# Patient Record
Sex: MALE | Race: BLACK OR AFRICAN AMERICAN | NOT HISPANIC OR LATINO | URBAN - METROPOLITAN AREA
[De-identification: names, ages, dates, MRNs, and addresses within clinical notes are randomized per-mention and may not be internally consistent; named-entity substitution may affect disease eponyms.]

---

## 2023-01-27 PROBLEM — N14.11 CONTRAST DYE INDUCED NEPHROPATHY: Status: ACTIVE | Noted: 2023-01-27

## 2023-01-27 PROBLEM — H52.13 BILATERAL MYOPIA: Status: ACTIVE | Noted: 2023-01-27

## 2023-01-27 PROBLEM — E55.9 VITAMIN D DEFICIENCY: Status: ACTIVE | Noted: 2023-01-27

## 2023-01-27 PROBLEM — T50.8X5A CONTRAST DYE INDUCED NEPHROPATHY: Status: ACTIVE | Noted: 2023-01-27

## 2023-01-27 PROBLEM — R21 GROIN RASH: Status: ACTIVE | Noted: 2023-01-27

## 2023-01-27 PROBLEM — L73.2 HIDRADENITIS SUPPURATIVA: Status: ACTIVE | Noted: 2023-01-27

## 2023-01-27 PROBLEM — H52.203 ASTIGMATISM, BILATERAL: Status: ACTIVE | Noted: 2023-01-27

## 2023-01-27 PROBLEM — N48.9 PENILE LESION: Status: ACTIVE | Noted: 2023-01-27

## 2023-01-27 PROBLEM — H25.13 AGE-RELATED NUCLEAR CATARACT, BILATERAL: Status: ACTIVE | Noted: 2023-01-27

## 2023-01-27 PROBLEM — N50.89 SCROTAL SWELLING: Status: ACTIVE | Noted: 2023-01-27

## 2023-01-27 PROBLEM — R53.83 FATIGUE: Status: ACTIVE | Noted: 2023-01-27

## 2023-01-27 PROBLEM — H52.13 MYOPIC ASTIGMATISM OF BOTH EYES: Status: ACTIVE | Noted: 2023-01-27

## 2023-01-27 PROBLEM — H52.203 MYOPIC ASTIGMATISM OF BOTH EYES: Status: ACTIVE | Noted: 2023-01-27

## 2023-01-27 PROBLEM — K61.1 PERI-RECTAL ABSCESS: Status: ACTIVE | Noted: 2023-01-27

## 2023-01-27 PROBLEM — E66.9 OBESITY: Status: ACTIVE | Noted: 2023-01-27

## 2023-01-27 PROBLEM — E66.3 OVERWEIGHT: Status: ACTIVE | Noted: 2023-01-27

## 2023-01-27 PROBLEM — K50.90 CROHN'S DISEASE (MULTI): Status: ACTIVE | Noted: 2023-01-27

## 2023-01-27 PROBLEM — H25.13 AGE-RELATED NUCLEAR CATARACT OF BOTH EYES: Status: ACTIVE | Noted: 2023-01-27

## 2023-01-27 RX ORDER — CYCLOBENZAPRINE HCL 10 MG
1 TABLET ORAL 2 TIMES DAILY
COMMUNITY
Start: 2019-02-02 | End: 2023-10-31 | Stop reason: WASHOUT

## 2023-01-27 RX ORDER — ADALIMUMAB 40MG/0.8ML
KIT SUBCUTANEOUS
COMMUNITY
Start: 2017-07-24 | End: 2024-03-13 | Stop reason: SDUPTHER

## 2023-01-27 RX ORDER — BENZOYL PEROXIDE 100 MG/ML
LIQUID TOPICAL
COMMUNITY
Start: 2017-10-12 | End: 2023-10-31 | Stop reason: WASHOUT

## 2023-01-27 RX ORDER — TRIAMCINOLONE ACETONIDE 0.25 MG/G
CREAM TOPICAL 2 TIMES DAILY
COMMUNITY
Start: 2018-01-23 | End: 2023-10-31 | Stop reason: WASHOUT

## 2023-01-27 RX ORDER — IBUPROFEN 800 MG/1
1 TABLET ORAL 3 TIMES DAILY
COMMUNITY
Start: 2016-04-15 | End: 2023-10-31 | Stop reason: WASHOUT

## 2023-01-27 RX ORDER — CLINDAMYCIN PHOSPHATE 10 UG/ML
LOTION TOPICAL 2 TIMES DAILY
COMMUNITY
Start: 2017-04-26 | End: 2023-03-07

## 2023-03-05 NOTE — PROGRESS NOTES
Subjective   J Carlos eYn is a 37 y.o. male who presents for Establish Care.  37M new is here for establishment of care and for preventive care visit.    PMHx:  -Hidradenitis suppuortiva- gets symptoms in the axillary region but also notes swelling and sores in scrotum and buttocks, at times bursts and bleeds, a/w severe pruritus, last seen by physician in many years. He was receiving humira every so often but notes some remission but never remission. He has been seen by derm for this and then lost to followup. Surgery was proposed but he was hesitant in the past. He was also seen by urology, and gastroenterology.  - Crohn's disease - treated intermittently with Humira, though has been off this medication for some time, has been using his wife's Humira who has a similar diagnosis   - Chronic scrotal and penile swelling evidently was seen b y     Patient was seen by , derm and surgery and was recommended surgical intervention but he declined.  He is currently interested in surgical evaluation as well.  Denies fevers, chills, chest pain palpitations shortness of breath, changes in rash or worsening of his symptoms in general.    Review as systems as indicated in HPI, otherwise unremarkable.     Objective     /81   Temp 36.8 °C (98.3 °F)   Wt 84.6 kg (186 lb 6.4 oz)      Physical Exam  Exam conducted with a chaperone present (wife present in room, additional chaperone declined. Pt agreeable to both).   Constitutional:       General: He is not in acute distress.  HENT:      Head: Normocephalic and atraumatic.   Eyes:      Conjunctiva/sclera: Conjunctivae normal.   Cardiovascular:      Rate and Rhythm: Normal rate and regular rhythm.      Heart sounds: No murmur heard.  Pulmonary:      Effort: Pulmonary effort is normal.      Breath sounds: Normal breath sounds. No wheezing or rhonchi.   Abdominal:      General: There is no distension.      Palpations: Abdomen is soft.      Tenderness: There is no abdominal  tenderness. There is no guarding or rebound.   Genitourinary:     Penis: Swelling present.       Testes:         Left: Swelling present.      Comments: Severe scrotal and penile edema without notable discharge, no inguinal LAD appreciated   Musculoskeletal:         General: No tenderness.      Right lower leg: No edema.      Left lower leg: No edema.   Skin:     General: Skin is warm and dry.   Neurological:      General: No focal deficit present.      Mental Status: He is alert and oriented to person, place, and time.   Psychiatric:         Mood and Affect: Mood normal.         Problem List Items Addressed This Visit    Assessment/Plan      Genitourinary    Scrotal swelling     Attributed to combination crohn's and HS,will refer to urology as well as dermatology for further management         Relevant Orders    Referral to Urology       Infectious/Inflammatory    Hidradenitis suppurativa - Primary     Chronic and longstanding, failed several topical preparations including clindamycin and benzyl peroxide.  He has been on biological therapy but this has been discontinued.  Will refer to dermatology         Relevant Orders    Referral to Dermatology    Referral to Dermatology       Immune    Crohn's disease (CMS/HCC)     Last seen by gastroenterology and lost to follow-up, due for colonoscopy as well as consideration for further management.  Will obtain inflammatory markers and fecal calprotectin         Relevant Orders    CBC and Auto Differential    Vitamin D, Total    Iron and TIBC    Ferritin    Reticulocytes    Vitamin B12    Methylmalonic acid, serum    Referral to Gastroenterology    Sedimentation Rate    C-reactive protein    Calprotectin Stool     Other Visit Diagnoses       Routine adult health maintenance        Relevant Orders    CBC and Auto Differential    Comprehensive Metabolic Panel    Hemoglobin A1C    Lipid Panel    TSH with reflex to Free T4 if abnormal    Vitamin D, Total    Hepatitis B Surface  Antigen    Hepatitis B surface antibody    Hepatitis B core antibody, total    Hepatitis C Antibody    Crohn's disease of colon without complication (CMS/HCC)                    Yaima Lanier DO

## 2023-03-07 ENCOUNTER — LAB (OUTPATIENT)
Dept: LAB | Facility: LAB | Age: 38
End: 2023-03-07
Payer: COMMERCIAL

## 2023-03-07 ENCOUNTER — OFFICE VISIT (OUTPATIENT)
Dept: PRIMARY CARE | Facility: CLINIC | Age: 38
End: 2023-03-07
Payer: COMMERCIAL

## 2023-03-07 VITALS — TEMPERATURE: 98.3 F | DIASTOLIC BLOOD PRESSURE: 81 MMHG | SYSTOLIC BLOOD PRESSURE: 152 MMHG | WEIGHT: 186.4 LBS

## 2023-03-07 DIAGNOSIS — K50.10 CROHN'S DISEASE OF COLON WITHOUT COMPLICATION (MULTI): ICD-10-CM

## 2023-03-07 DIAGNOSIS — K50.80 CROHN'S DISEASE OF BOTH SMALL AND LARGE INTESTINE WITHOUT COMPLICATION (MULTI): ICD-10-CM

## 2023-03-07 DIAGNOSIS — Z00.00 ROUTINE ADULT HEALTH MAINTENANCE: ICD-10-CM

## 2023-03-07 DIAGNOSIS — N50.89 SCROTAL SWELLING: ICD-10-CM

## 2023-03-07 DIAGNOSIS — K50.818 CROHN'S DISEASE OF BOTH SMALL AND LARGE INTESTINE WITH OTHER COMPLICATION (MULTI): ICD-10-CM

## 2023-03-07 DIAGNOSIS — L73.2 HIDRADENITIS SUPPURATIVA: Primary | ICD-10-CM

## 2023-03-07 LAB
ALANINE AMINOTRANSFERASE (SGPT) (U/L) IN SER/PLAS: 10 U/L (ref 10–52)
ALBUMIN (G/DL) IN SER/PLAS: 3.7 G/DL (ref 3.4–5)
ALKALINE PHOSPHATASE (U/L) IN SER/PLAS: 83 U/L (ref 33–120)
ANION GAP IN SER/PLAS: 10 MMOL/L (ref 10–20)
ASPARTATE AMINOTRANSFERASE (SGOT) (U/L) IN SER/PLAS: 13 U/L (ref 9–39)
BASOPHILS (10*3/UL) IN BLOOD BY AUTOMATED COUNT: 0.07 X10E9/L (ref 0–0.1)
BASOPHILS/100 LEUKOCYTES IN BLOOD BY AUTOMATED COUNT: 0.7 % (ref 0–2)
BILIRUBIN TOTAL (MG/DL) IN SER/PLAS: 0.3 MG/DL (ref 0–1.2)
C REACTIVE PROTEIN (MG/L) IN SER/PLAS: 1.2 MG/DL
CALCIDIOL (25 OH VITAMIN D3) (NG/ML) IN SER/PLAS: 9 NG/ML
CALCIUM (MG/DL) IN SER/PLAS: 9.1 MG/DL (ref 8.6–10.6)
CARBON DIOXIDE, TOTAL (MMOL/L) IN SER/PLAS: 27 MMOL/L (ref 21–32)
CHLORIDE (MMOL/L) IN SER/PLAS: 106 MMOL/L (ref 98–107)
CHOLESTEROL (MG/DL) IN SER/PLAS: 105 MG/DL (ref 0–199)
CHOLESTEROL IN HDL (MG/DL) IN SER/PLAS: 33 MG/DL
CHOLESTEROL/HDL RATIO: 3.2
COBALAMIN (VITAMIN B12) (PG/ML) IN SER/PLAS: 398 PG/ML (ref 211–911)
CREATININE (MG/DL) IN SER/PLAS: 0.85 MG/DL (ref 0.5–1.3)
EOSINOPHILS (10*3/UL) IN BLOOD BY AUTOMATED COUNT: 0.23 X10E9/L (ref 0–0.7)
EOSINOPHILS/100 LEUKOCYTES IN BLOOD BY AUTOMATED COUNT: 2.3 % (ref 0–6)
ERYTHROCYTE DISTRIBUTION WIDTH (RATIO) BY AUTOMATED COUNT: 16 % (ref 11.5–14.5)
ERYTHROCYTE MEAN CORPUSCULAR HEMOGLOBIN CONCENTRATION (G/DL) BY AUTOMATED: 29.7 G/DL (ref 32–36)
ERYTHROCYTE MEAN CORPUSCULAR VOLUME (FL) BY AUTOMATED COUNT: 82 FL (ref 80–100)
ERYTHROCYTES (10*6/UL) IN BLOOD BY AUTOMATED COUNT: 4.28 X10E12/L (ref 4.5–5.9)
FERRITIN (UG/LL) IN SER/PLAS: 153 UG/L (ref 20–300)
GFR MALE: >90 ML/MIN/1.73M2
GLUCOSE (MG/DL) IN SER/PLAS: 89 MG/DL (ref 74–99)
HEMATOCRIT (%) IN BLOOD BY AUTOMATED COUNT: 35 % (ref 41–52)
HEMOGLOBIN (G/DL) IN BLOOD: 10.4 G/DL (ref 13.5–17.5)
HEMOGLOBIN (PG) IN RETICULOCYTES: 28 PG (ref 28–38)
HEPATITIS B VIRUS CORE AB (PRESENCE) IN SER/PLAS BY IMM: NONREACTIVE
HEPATITIS B VIRUS SURFACE AB (MIU/ML) IN SERUM: 52.1 MIU/ML
HEPATITIS B VIRUS SURFACE AG PRESENCE IN SERUM: NONREACTIVE
HEPATITIS C VIRUS AB PRESENCE IN SERUM: NONREACTIVE
IMMATURE GRANULOCYTES/100 LEUKOCYTES IN BLOOD BY AUTOMATED COUNT: 0.3 % (ref 0–0.9)
IMMATURE RETIC FRACTION: 12.1 % (ref 0–16)
IRON (UG/DL) IN SER/PLAS: 15 UG/DL (ref 35–150)
IRON BINDING CAPACITY (UG/DL) IN SER/PLAS: 285 UG/DL (ref 240–445)
IRON SATURATION (%) IN SER/PLAS: 5 % (ref 25–45)
LDL: 61 MG/DL (ref 0–99)
LEUKOCYTES (10*3/UL) IN BLOOD BY AUTOMATED COUNT: 9.8 X10E9/L (ref 4.4–11.3)
LYMPHOCYTES (10*3/UL) IN BLOOD BY AUTOMATED COUNT: 2.5 X10E9/L (ref 1.2–4.8)
LYMPHOCYTES/100 LEUKOCYTES IN BLOOD BY AUTOMATED COUNT: 25.5 % (ref 13–44)
MONOCYTES (10*3/UL) IN BLOOD BY AUTOMATED COUNT: 0.65 X10E9/L (ref 0.1–1)
MONOCYTES/100 LEUKOCYTES IN BLOOD BY AUTOMATED COUNT: 6.6 % (ref 2–10)
NEUTROPHILS (10*3/UL) IN BLOOD BY AUTOMATED COUNT: 6.31 X10E9/L (ref 1.2–7.7)
NEUTROPHILS/100 LEUKOCYTES IN BLOOD BY AUTOMATED COUNT: 64.6 % (ref 40–80)
NRBC (PER 100 WBCS) BY AUTOMATED COUNT: 0 /100 WBC (ref 0–0)
PLATELETS (10*3/UL) IN BLOOD AUTOMATED COUNT: 447 X10E9/L (ref 150–450)
POTASSIUM (MMOL/L) IN SER/PLAS: 4.5 MMOL/L (ref 3.5–5.3)
PROTEIN TOTAL: 8 G/DL (ref 6.4–8.2)
RETICULOCYTES (10*3/UL) IN BLOOD: 0.05 X10E12/L (ref 0.02–0.12)
RETICULOCYTES/100 ERYTHROCYTES IN BLOOD BY AUTOMATED COUNT: 1.2 % (ref 0.5–2)
SEDIMENTATION RATE, ERYTHROCYTE: 62 MM/H (ref 0–15)
SODIUM (MMOL/L) IN SER/PLAS: 138 MMOL/L (ref 136–145)
THYROTROPIN (MIU/L) IN SER/PLAS BY DETECTION LIMIT <= 0.05 MIU/L: 1.4 MIU/L (ref 0.44–3.98)
TRIGLYCERIDE (MG/DL) IN SER/PLAS: 56 MG/DL (ref 0–149)
UREA NITROGEN (MG/DL) IN SER/PLAS: 7 MG/DL (ref 6–23)
VLDL: 11 MG/DL (ref 0–40)

## 2023-03-07 PROCEDURE — 86803 HEPATITIS C AB TEST: CPT

## 2023-03-07 PROCEDURE — 83550 IRON BINDING TEST: CPT

## 2023-03-07 PROCEDURE — 85025 COMPLETE CBC W/AUTO DIFF WBC: CPT

## 2023-03-07 PROCEDURE — 85652 RBC SED RATE AUTOMATED: CPT

## 2023-03-07 PROCEDURE — 80053 COMPREHEN METABOLIC PANEL: CPT

## 2023-03-07 PROCEDURE — 80061 LIPID PANEL: CPT

## 2023-03-07 PROCEDURE — 87467 HEPATITIS B SURFACE AG QUAN: CPT

## 2023-03-07 PROCEDURE — 83921 ORGANIC ACID SINGLE QUANT: CPT

## 2023-03-07 PROCEDURE — 82728 ASSAY OF FERRITIN: CPT

## 2023-03-07 PROCEDURE — 83540 ASSAY OF IRON: CPT

## 2023-03-07 PROCEDURE — 36415 COLL VENOUS BLD VENIPUNCTURE: CPT

## 2023-03-07 PROCEDURE — 84443 ASSAY THYROID STIM HORMONE: CPT

## 2023-03-07 PROCEDURE — 86704 HEP B CORE ANTIBODY TOTAL: CPT

## 2023-03-07 PROCEDURE — 83036 HEMOGLOBIN GLYCOSYLATED A1C: CPT

## 2023-03-07 PROCEDURE — 86140 C-REACTIVE PROTEIN: CPT

## 2023-03-07 PROCEDURE — 85045 AUTOMATED RETICULOCYTE COUNT: CPT

## 2023-03-07 PROCEDURE — 82607 VITAMIN B-12: CPT

## 2023-03-07 PROCEDURE — 86706 HEP B SURFACE ANTIBODY: CPT

## 2023-03-07 PROCEDURE — 82306 VITAMIN D 25 HYDROXY: CPT

## 2023-03-07 PROCEDURE — 99204 OFFICE O/P NEW MOD 45 MIN: CPT | Performed by: INTERNAL MEDICINE

## 2023-03-07 NOTE — ASSESSMENT & PLAN NOTE
Chronic and longstanding, failed several topical preparations including clindamycin and benzyl peroxide.  He has been on biological therapy but this has been discontinued.  Will refer to dermatology

## 2023-03-07 NOTE — ASSESSMENT & PLAN NOTE
Last seen by gastroenterology and lost to follow-up, due for colonoscopy as well as consideration for further management.  Will obtain inflammatory markers and fecal calprotectin

## 2023-03-07 NOTE — ASSESSMENT & PLAN NOTE
Attributed to combination crohn's and HS,will refer to urology as well as dermatology for further management

## 2023-03-08 LAB
ESTIMATED AVERAGE GLUCOSE FOR HBA1C: 123 MG/DL
HEMOGLOBIN A1C/HEMOGLOBIN TOTAL IN BLOOD: 5.9 %

## 2023-03-10 DIAGNOSIS — E55.9 VITAMIN D DEFICIENCY: Primary | ICD-10-CM

## 2023-03-10 DIAGNOSIS — E55.9 VITAMIN D DEFICIENCY: ICD-10-CM

## 2023-03-10 RX ORDER — ERGOCALCIFEROL 1.25 MG/1
50000 CAPSULE ORAL
Qty: 12 CAPSULE | Refills: 0 | Status: SHIPPED | OUTPATIENT
Start: 2023-03-10 | End: 2023-04-17 | Stop reason: SDUPTHER

## 2023-03-10 RX ORDER — ERGOCALCIFEROL 1.25 MG/1
50000 CAPSULE ORAL
Qty: 12 CAPSULE | Refills: 0 | Status: SHIPPED | OUTPATIENT
Start: 2023-03-10 | End: 2023-03-10 | Stop reason: SDUPTHER

## 2023-03-11 LAB — METHYLMALONIC ACID, S: 0.11 UMOL/L (ref 0–0.4)

## 2023-04-17 DIAGNOSIS — E55.9 VITAMIN D DEFICIENCY: ICD-10-CM

## 2023-04-17 RX ORDER — ERGOCALCIFEROL 1.25 MG/1
50000 CAPSULE ORAL
Qty: 12 CAPSULE | Refills: 0 | Status: SHIPPED | OUTPATIENT
Start: 2023-04-17 | End: 2023-07-10

## 2023-05-04 ENCOUNTER — TELEPHONE (OUTPATIENT)
Dept: PRIMARY CARE | Facility: CLINIC | Age: 38
End: 2023-05-04
Payer: COMMERCIAL

## 2023-05-04 NOTE — TELEPHONE ENCOUNTER
Called and spoke to both patient and his wife.  Evidently his wife sent a message over the portal.  He reports worsening of symptoms and has not yet been to see the specialist that had been referred him at our last visit.  Given his symptoms I advise he present urgently to the emergency room for additional work-up and for immediate consultation with the appropriate specialist.  He is in agreement, will call his wife right now to transport him to [facility] or the closest hospital.  We will follow-up closely.    ----- Message from Preet Ellsworth MA sent at 5/3/2023  8:43 AM EDT -----  Regarding: FW: Pain/ insecurity   Contact: [phone]    ----- Message -----  From: J Carlos Yen  Sent: 5/3/2023   8:14 AM EDT  To: Do [id number] [facility] Clinical Support Staff  Subject: Pain/ insecurity                                 Good morning Dr. Lanier! I am at a point where I don’t know what to do, my tailbone is leaking, my scrotum and or testicles is leaking and I am bleeding both inside and out! The smell is something awful and I can barely walk, stand or sit. I am very uncomfortable and it’s making very angry! What do you recommend! Also I am on the schedule for gastro this month and urology in July, this is really starting to affect my everyday life and work schedule. I can not walk more than 4 steps without breaking out into a sweat.

## 2023-05-12 ENCOUNTER — PATIENT OUTREACH (OUTPATIENT)
Dept: PRIMARY CARE | Facility: CLINIC | Age: 38
End: 2023-05-12
Payer: COMMERCIAL

## 2023-05-12 DIAGNOSIS — L73.2 HIDRADENITIS: ICD-10-CM

## 2023-05-12 DIAGNOSIS — L02.91 ABSCESS: ICD-10-CM

## 2023-05-12 RX ORDER — AMOXICILLIN AND CLAVULANATE POTASSIUM 875; 125 MG/1; MG/1
875 TABLET, FILM COATED ORAL
COMMUNITY
End: 2024-01-31 | Stop reason: WASHOUT

## 2023-05-12 NOTE — PROGRESS NOTES
Discharge Facility:[facility]  Discharge Diagnosis:Abscess, Hidradenitis  Admission Date:5/7/23  Discharge Date: 5/11/23    PCP Appointment Date:5/16/23  Specialist Appointment Date: 5/18/23, 6/7/23  Hospital Encounter and Summary: Linked   See discharge assessment below for further details  Engagement  Call Start Time: 1132 (5/12/2023 11:40 AM)    Medications  Medications reviewed with patient/caregiver?: Yes (5/12/2023 11:40 AM)  Is the patient having any side effects they believe may be caused by any medication additions or changes?: No (5/12/2023 11:40 AM)  Does the patient have all medications ordered at discharge?: Yes (5/12/2023 11:40 AM)  Prescription Comments: see med list (5/12/2023 11:40 AM)  Is the patient taking all medications as directed (includes completed medication regime)?: Yes (5/12/2023 11:40 AM)  Care Management Interventions: Provided patient education (5/12/2023 11:40 AM)  Medication Comments: see med list (5/12/2023 11:40 AM)    Appointments  Does the patient have a primary care provider?: Yes (5/12/2023 11:40 AM)  Care Management Interventions: Verified appointment date/time/provider; Educated patient on importance of making appointment; Advised patient to make appointment (5/12/2023 11:40 AM)  Has the patient kept scheduled appointments due by today?: Yes (5/12/2023 11:40 AM)  Care Management Interventions: Advised patient to keep appointment (5/12/2023 11:40 AM)    Patient Teaching  Does the patient have access to their discharge instructions?: Yes (5/12/2023 11:40 AM)  Care Management Interventions: Reviewed instructions with patient (5/12/2023 11:40 AM)  What is the patient's perception of their health status since discharge?: Improving (5/12/2023 11:40 AM)

## 2023-05-16 ENCOUNTER — APPOINTMENT (OUTPATIENT)
Dept: PRIMARY CARE | Facility: CLINIC | Age: 38
End: 2023-05-16
Payer: COMMERCIAL

## 2023-05-23 LAB — CALPROTECTIN, STOOL: 33 UG/G

## 2023-05-25 LAB
AB TO ADALIMUMAB(ATA) CONC.: <1.7 U/ML
ADA RESULTS: NORMAL
ADALIMUMAB(ADA) CONC.: <1.6 UG/ML

## 2023-08-01 LAB
CALCIUM OXALATE CRYSTALS, URINE: ABNORMAL /HPF
MUCUS, URINE: ABNORMAL /LPF
RBC, URINE: 21 /HPF (ref 0–5)
SQUAMOUS EPITHELIAL CELLS, URINE: <1 /HPF
WBC, URINE: <1 /HPF (ref 0–5)

## 2023-08-28 ENCOUNTER — E-VISIT (OUTPATIENT)
Dept: PRIMARY CARE | Facility: CLINIC | Age: 38
End: 2023-08-28
Payer: COMMERCIAL

## 2023-09-15 ENCOUNTER — HOSPITAL ENCOUNTER (OUTPATIENT)
Dept: DATA CONVERSION | Facility: HOSPITAL | Age: 38
End: 2023-09-15
Attending: INTERNAL MEDICINE | Admitting: INTERNAL MEDICINE
Payer: COMMERCIAL

## 2023-09-15 DIAGNOSIS — Z98.0 INTESTINAL BYPASS AND ANASTOMOSIS STATUS: ICD-10-CM

## 2023-09-15 DIAGNOSIS — K50.90 CROHN'S DISEASE, UNSPECIFIED, WITHOUT COMPLICATIONS (MULTI): ICD-10-CM

## 2023-09-15 DIAGNOSIS — L73.2 HIDRADENITIS SUPPURATIVA: ICD-10-CM

## 2023-09-15 DIAGNOSIS — K50.80 CROHN'S DISEASE OF BOTH SMALL AND LARGE INTESTINE WITHOUT COMPLICATIONS (MULTI): ICD-10-CM

## 2023-10-26 ASSESSMENT — PROMIS GLOBAL HEALTH SCALE
RATE_GENERAL_HEALTH: POOR
EMOTIONAL_PROBLEMS: ALWAYS
CARRYOUT_SOCIAL_ACTIVITIES: POOR
RATE_AVERAGE_PAIN: 9
RATE_MENTAL_HEALTH: FAIR
RATE_PHYSICAL_HEALTH: POOR
CARRYOUT_PHYSICAL_ACTIVITIES: A LITTLE
RATE_QUALITY_OF_LIFE: FAIR
RATE_SOCIAL_SATISFACTION: POOR
RATE_AVERAGE_FATIGUE: VERY SEVERE

## 2023-10-31 ENCOUNTER — OFFICE VISIT (OUTPATIENT)
Dept: PRIMARY CARE | Facility: CLINIC | Age: 38
End: 2023-10-31
Payer: COMMERCIAL

## 2023-10-31 ENCOUNTER — LAB (OUTPATIENT)
Dept: LAB | Facility: LAB | Age: 38
End: 2023-10-31
Payer: COMMERCIAL

## 2023-10-31 VITALS
DIASTOLIC BLOOD PRESSURE: 75 MMHG | OXYGEN SATURATION: 98 % | BODY MASS INDEX: 28.83 KG/M2 | HEIGHT: 66 IN | WEIGHT: 179.4 LBS | HEART RATE: 105 BPM | SYSTOLIC BLOOD PRESSURE: 137 MMHG | TEMPERATURE: 99.2 F

## 2023-10-31 DIAGNOSIS — D50.0 IRON DEFICIENCY ANEMIA DUE TO CHRONIC BLOOD LOSS: Primary | ICD-10-CM

## 2023-10-31 DIAGNOSIS — R73.03 PREDIABETES: ICD-10-CM

## 2023-10-31 DIAGNOSIS — Z00.00 ENCOUNTER FOR PREVENTATIVE ADULT HEALTH CARE EXAMINATION: ICD-10-CM

## 2023-10-31 DIAGNOSIS — L73.2 HIDRADENITIS SUPPURATIVA: ICD-10-CM

## 2023-10-31 DIAGNOSIS — M62.830 BACK MUSCLE SPASM: ICD-10-CM

## 2023-10-31 DIAGNOSIS — K50.919 CROHN'S DISEASE WITH COMPLICATION, UNSPECIFIED GASTROINTESTINAL TRACT LOCATION (MULTI): ICD-10-CM

## 2023-10-31 LAB
EST. AVERAGE GLUCOSE BLD GHB EST-MCNC: 108 MG/DL
HBA1C MFR BLD: 5.4 %

## 2023-10-31 PROCEDURE — 4004F PT TOBACCO SCREEN RCVD TLK: CPT | Performed by: INTERNAL MEDICINE

## 2023-10-31 PROCEDURE — 3008F BODY MASS INDEX DOCD: CPT | Performed by: INTERNAL MEDICINE

## 2023-10-31 PROCEDURE — 99214 OFFICE O/P EST MOD 30 MIN: CPT | Performed by: INTERNAL MEDICINE

## 2023-10-31 PROCEDURE — 85025 COMPLETE CBC W/AUTO DIFF WBC: CPT

## 2023-10-31 PROCEDURE — 36415 COLL VENOUS BLD VENIPUNCTURE: CPT

## 2023-10-31 PROCEDURE — 83036 HEMOGLOBIN GLYCOSYLATED A1C: CPT

## 2023-10-31 RX ORDER — METFORMIN HYDROCHLORIDE 500 MG/1
1000 TABLET, EXTENDED RELEASE ORAL
Qty: 180 TABLET | Refills: 1 | Status: SHIPPED | OUTPATIENT
Start: 2023-10-31 | End: 2024-02-05 | Stop reason: SDUPTHER

## 2023-10-31 RX ORDER — DOXYCYCLINE 100 MG/1
100 CAPSULE ORAL 2 TIMES DAILY
COMMUNITY
End: 2023-10-31 | Stop reason: SDUPTHER

## 2023-10-31 RX ORDER — DOXYCYCLINE 100 MG/1
100 CAPSULE ORAL 2 TIMES DAILY
Qty: 90 CAPSULE | Refills: 0 | Status: SHIPPED | OUTPATIENT
Start: 2023-10-31 | End: 2024-01-31 | Stop reason: SDUPTHER

## 2023-10-31 RX ORDER — CYCLOBENZAPRINE HCL 5 MG
5 TABLET ORAL NIGHTLY PRN
Qty: 30 TABLET | Refills: 0 | Status: SHIPPED | OUTPATIENT
Start: 2023-10-31 | End: 2024-01-11

## 2023-10-31 ASSESSMENT — PATIENT HEALTH QUESTIONNAIRE - PHQ9
1. LITTLE INTEREST OR PLEASURE IN DOING THINGS: NOT AT ALL
2. FEELING DOWN, DEPRESSED OR HOPELESS: NOT AT ALL
SUM OF ALL RESPONSES TO PHQ9 QUESTIONS 1 AND 2: 0

## 2023-10-31 ASSESSMENT — PAIN SCALES - GENERAL: PAINLEVEL: 7

## 2023-10-31 NOTE — ASSESSMENT & PLAN NOTE
Extensively discussed, interested in nutrition referral.   Will start prediabetes, potential side effects reviewed.

## 2023-10-31 NOTE — PATIENT INSTRUCTIONS
J Carlos,   Follow-up with dermatology as scheduled.  For prediabetes and HS  Refill for doxycycline has been prescribed   Start metformin 1 tablet once per day with food. If tolerating, increase to 2 tablets once per day   Take 5000 units of Vitamin D3 daily FOREVER  Iron deficiency - take 325mg every OTHER day.   For back spasm - can take cyclobenzaprine as needed. Caution as this may be sedating   I have ordered blood and/or urine tests for you to do today. The lab can be found on this floor (2nd floor) next to the pharmacy across from the elevators.    Followup 3 months for physical.

## 2023-11-01 LAB
BASOPHILS # BLD AUTO: 0.1 X10*3/UL (ref 0–0.1)
BASOPHILS NFR BLD AUTO: 0.6 %
EOSINOPHIL # BLD AUTO: 0.5 X10*3/UL (ref 0–0.7)
EOSINOPHIL NFR BLD AUTO: 3 %
ERYTHROCYTE [DISTWIDTH] IN BLOOD BY AUTOMATED COUNT: 18.2 % (ref 11.5–14.5)
HCT VFR BLD AUTO: 29.7 % (ref 41–52)
HGB BLD-MCNC: 8.5 G/DL (ref 13.5–17.5)
IMM GRANULOCYTES # BLD AUTO: 0.08 X10*3/UL (ref 0–0.7)
IMM GRANULOCYTES NFR BLD AUTO: 0.5 % (ref 0–0.9)
LYMPHOCYTES # BLD AUTO: 2.46 X10*3/UL (ref 1.2–4.8)
LYMPHOCYTES NFR BLD AUTO: 14.9 %
MCH RBC QN AUTO: 22.3 PG (ref 26–34)
MCHC RBC AUTO-ENTMCNC: 28.6 G/DL (ref 32–36)
MCV RBC AUTO: 78 FL (ref 80–100)
MONOCYTES # BLD AUTO: 1.11 X10*3/UL (ref 0.1–1)
MONOCYTES NFR BLD AUTO: 6.7 %
NEUTROPHILS # BLD AUTO: 12.21 X10*3/UL (ref 1.2–7.7)
NEUTROPHILS NFR BLD AUTO: 74.3 %
NRBC BLD-RTO: 0 /100 WBCS (ref 0–0)
PLATELET # BLD AUTO: 577 X10*3/UL (ref 150–450)
PMV BLD AUTO: 10.5 FL (ref 7.5–11.5)
RBC # BLD AUTO: 3.82 X10*6/UL (ref 4.5–5.9)
WBC # BLD AUTO: 16.5 X10*3/UL (ref 4.4–11.3)

## 2023-11-30 ENCOUNTER — OFFICE VISIT (OUTPATIENT)
Dept: DERMATOLOGY | Facility: CLINIC | Age: 38
End: 2023-11-30
Payer: COMMERCIAL

## 2023-11-30 ENCOUNTER — LAB (OUTPATIENT)
Dept: LAB | Facility: LAB | Age: 38
End: 2023-11-30
Payer: COMMERCIAL

## 2023-11-30 DIAGNOSIS — L73.2 HIDRADENITIS SUPPURATIVA: ICD-10-CM

## 2023-11-30 DIAGNOSIS — L73.2 HIDRADENITIS SUPPURATIVA: Primary | ICD-10-CM

## 2023-11-30 LAB
ALBUMIN SERPL BCP-MCNC: 3.5 G/DL (ref 3.4–5)
ALP SERPL-CCNC: 79 U/L (ref 33–120)
ALT SERPL W P-5'-P-CCNC: 17 U/L (ref 10–52)
ANION GAP SERPL CALC-SCNC: 13 MMOL/L (ref 10–20)
AST SERPL W P-5'-P-CCNC: 16 U/L (ref 9–39)
BILIRUB SERPL-MCNC: 0.3 MG/DL (ref 0–1.2)
BUN SERPL-MCNC: 7 MG/DL (ref 6–23)
CALCIUM SERPL-MCNC: 8.7 MG/DL (ref 8.6–10.6)
CHLORIDE SERPL-SCNC: 103 MMOL/L (ref 98–107)
CO2 SERPL-SCNC: 27 MMOL/L (ref 21–32)
CREAT SERPL-MCNC: 0.74 MG/DL (ref 0.5–1.3)
ERYTHROCYTE [DISTWIDTH] IN BLOOD BY AUTOMATED COUNT: 17 % (ref 11.5–14.5)
GFR SERPL CREATININE-BSD FRML MDRD: >90 ML/MIN/1.73M*2
GLUCOSE SERPL-MCNC: 94 MG/DL (ref 74–99)
HBV CORE AB SER QL: NONREACTIVE
HBV SURFACE AB SER-ACNC: 46.3 MIU/ML
HBV SURFACE AG SERPL QL IA: NONREACTIVE
HCT VFR BLD AUTO: 29.4 % (ref 41–52)
HCV AB SER QL: NONREACTIVE
HGB BLD-MCNC: 8.9 G/DL (ref 13.5–17.5)
HIV 1+2 AB+HIV1 P24 AG SERPL QL IA: NONREACTIVE
MCH RBC QN AUTO: 22 PG (ref 26–34)
MCHC RBC AUTO-ENTMCNC: 30.3 G/DL (ref 32–36)
MCV RBC AUTO: 73 FL (ref 80–100)
NRBC BLD-RTO: 0 /100 WBCS (ref 0–0)
PLATELET # BLD AUTO: 694 X10*3/UL (ref 150–450)
POTASSIUM SERPL-SCNC: 4 MMOL/L (ref 3.5–5.3)
PROT SERPL-MCNC: 7.7 G/DL (ref 6.4–8.2)
RBC # BLD AUTO: 4.05 X10*6/UL (ref 4.5–5.9)
SODIUM SERPL-SCNC: 139 MMOL/L (ref 136–145)
WBC # BLD AUTO: 15.3 X10*3/UL (ref 4.4–11.3)

## 2023-11-30 PROCEDURE — 86481 TB AG RESPONSE T-CELL SUSP: CPT

## 2023-11-30 PROCEDURE — 85027 COMPLETE CBC AUTOMATED: CPT

## 2023-11-30 PROCEDURE — 80053 COMPREHEN METABOLIC PANEL: CPT

## 2023-11-30 PROCEDURE — 36415 COLL VENOUS BLD VENIPUNCTURE: CPT

## 2023-11-30 PROCEDURE — 3008F BODY MASS INDEX DOCD: CPT | Performed by: DERMATOLOGY

## 2023-11-30 PROCEDURE — 86706 HEP B SURFACE ANTIBODY: CPT

## 2023-11-30 PROCEDURE — 4004F PT TOBACCO SCREEN RCVD TLK: CPT | Performed by: DERMATOLOGY

## 2023-11-30 PROCEDURE — 87389 HIV-1 AG W/HIV-1&-2 AB AG IA: CPT

## 2023-11-30 PROCEDURE — 99204 OFFICE O/P NEW MOD 45 MIN: CPT | Performed by: DERMATOLOGY

## 2023-11-30 PROCEDURE — 87340 HEPATITIS B SURFACE AG IA: CPT

## 2023-11-30 PROCEDURE — 86704 HEP B CORE ANTIBODY TOTAL: CPT

## 2023-11-30 PROCEDURE — 86803 HEPATITIS C AB TEST: CPT

## 2023-11-30 RX ORDER — CHLORHEXIDINE GLUCONATE 40 MG/ML
SOLUTION TOPICAL DAILY PRN
Qty: 300 ML | Refills: 3 | Status: SHIPPED | OUTPATIENT
Start: 2023-11-30

## 2023-11-30 ASSESSMENT — DERMATOLOGY QUALITY OF LIFE (QOL) ASSESSMENT
RATE HOW EMOTIONALLY BOTHERED YOU ARE BY YOUR SKIN PROBLEM (FOR EXAMPLE, WORRY, EMBARRASSMENT, FRUSTRATION): 6 - ALWAYS BOTHERED
RATE HOW BOTHERED YOU ARE BY EFFECTS OF YOUR SKIN PROBLEMS ON YOUR ACTIVITIES (EG, GOING OUT, ACCOMPLISHING WHAT YOU WANT, WORK ACTIVITIES OR YOUR RELATIONSHIPS WITH OTHERS): 6 - ALWAYS BOTHERED
WHAT SINGLE SKIN CONDITION LISTED BELOW IS THE PATIENT ANSWERING THE QUALITY-OF-LIFE ASSESSMENT QUESTIONS ABOUT: HIDRADENITIS SUPPURATIVA
RATE HOW BOTHERED YOU ARE BY SYMPTOMS OF YOUR SKIN PROBLEM (EG, ITCHING, STINGING BURNING, HURTING OR SKIN IRRITATION): 6 - ALWAYS BOTHERED
WHAT SINGLE SKIN CONDITION LISTED BELOW IS THE PATIENT ANSWERING THE QUALITY-OF-LIFE ASSESSMENT QUESTIONS ABOUT: HIDRADENITIS SUPPURATIVA
RATE HOW BOTHERED YOU ARE BY SYMPTOMS OF YOUR SKIN PROBLEM (EG, ITCHING, STINGING BURNING, HURTING OR SKIN IRRITATION): 6 - ALWAYS BOTHERED
RATE HOW BOTHERED YOU ARE BY EFFECTS OF YOUR SKIN PROBLEMS ON YOUR ACTIVITIES (EG, GOING OUT, ACCOMPLISHING WHAT YOU WANT, WORK ACTIVITIES OR YOUR RELATIONSHIPS WITH OTHERS): 6 - ALWAYS BOTHERED
RATE HOW EMOTIONALLY BOTHERED YOU ARE BY YOUR SKIN PROBLEM (FOR EXAMPLE, WORRY, EMBARRASSMENT, FRUSTRATION): 6 - ALWAYS BOTHERED

## 2023-11-30 ASSESSMENT — PATIENT GLOBAL ASSESSMENT (PGA): WHAT IS THE PGA: PATIENT GLOBAL ASSESSMENT:  4 - SEVERE

## 2023-11-30 NOTE — PROGRESS NOTES
Subjective     J Carlos Yen is a 38 y.o. male who presents for the following: Hidradenitis Suppurativa (Was previously on Humira for 1 year and was helping - now having insurance issues and been off for about 18 months. ). Rarely gets it in his armpits. Mainly in buttocks and groin.  Humira helped about 85% (reduced outbreaks and the size).  He is currently on minocycline and doxycycline.      Review of Systems:  No other skin or systemic complaints other than what is documented elsewhere in the note.    The following portions of the chart were reviewed this encounter and updated as appropriate:          Skin Cancer History  No skin cancer on file.      Specialty Problems          Dermatology Problems    Groin rash    Hidradenitis suppurativa        Objective   Well appearing patient in no apparent distress; mood and affect are within normal limits.    A focused skin examination was performed. All findings within normal limits unless otherwise noted below.    Assessment/Plan     Severe hidradenitits suppurativa with lymphedema. Will check labs today for Humira. Plan to restart Humira.

## 2023-12-02 LAB
NIL(NEG) CONTROL SPOT COUNT: NORMAL
PANEL A SPOT COUNT: 1
PANEL B SPOT COUNT: 1
POS CONTROL SPOT COUNT: NORMAL
T-SPOT. TB INTERPRETATION: NEGATIVE

## 2023-12-04 ENCOUNTER — TELEPHONE (OUTPATIENT)
Dept: LAB | Facility: HOSPITAL | Age: 38
End: 2023-12-04
Payer: COMMERCIAL

## 2023-12-04 DIAGNOSIS — D75.839 THROMBOCYTOSIS: Primary | ICD-10-CM

## 2023-12-05 ENCOUNTER — SPECIALTY PHARMACY (OUTPATIENT)
Dept: PHARMACY | Facility: CLINIC | Age: 38
End: 2023-12-05

## 2023-12-05 DIAGNOSIS — L73.2 HIDRADENITIS SUPPURATIVA: ICD-10-CM

## 2023-12-05 RX ORDER — ADALIMUMAB 40MG/0.4ML
40 KIT SUBCUTANEOUS
Qty: 4 EACH | Refills: 11 | Status: SHIPPED | OUTPATIENT
Start: 2023-12-05 | End: 2023-12-22 | Stop reason: SDUPTHER

## 2023-12-05 RX ORDER — ADALIMUMAB 80MG/0.8ML
KIT SUBCUTANEOUS
Qty: 3 EACH | Refills: 0 | Status: SHIPPED | OUTPATIENT
Start: 2023-12-05 | End: 2023-12-22 | Stop reason: SDUPTHER

## 2023-12-05 NOTE — TELEPHONE ENCOUNTER
I spoke with the patient about his results.  I discussed with the patient about his results.  His platelets are elevated and have been elevated in the past.  His white blood cell counts are also elevated as they have been in the past.  His red blood cell count is low as they have been in the past, though his MCV is also low.  I discussed with him that I would like him to see a blood doctor to evaluate these findings.  I don't want to assume that his elevated platelets are due to the inflammation from his hidradenititis suppurativa.  We will order the humira.

## 2023-12-11 DIAGNOSIS — M62.830 BACK MUSCLE SPASM: ICD-10-CM

## 2023-12-13 LAB — HOLD SPECIMEN: NORMAL

## 2023-12-22 DIAGNOSIS — L73.2 HIDRADENITIS SUPPURATIVA: ICD-10-CM

## 2023-12-22 RX ORDER — ADALIMUMAB 80MG/0.8ML
KIT SUBCUTANEOUS
Qty: 3 EACH | Refills: 0 | Status: SHIPPED | OUTPATIENT
Start: 2023-12-22 | End: 2024-01-21

## 2023-12-22 RX ORDER — ADALIMUMAB 40MG/0.4ML
40 KIT SUBCUTANEOUS
Qty: 4 EACH | Refills: 11 | Status: SHIPPED | OUTPATIENT
Start: 2023-12-22 | End: 2024-01-31 | Stop reason: WASHOUT

## 2024-01-11 RX ORDER — CYCLOBENZAPRINE HCL 5 MG
5 TABLET ORAL NIGHTLY PRN
Qty: 10 TABLET | Refills: 0 | Status: SHIPPED | OUTPATIENT
Start: 2024-01-11 | End: 2024-02-05 | Stop reason: SDUPTHER

## 2024-01-18 ENCOUNTER — APPOINTMENT (OUTPATIENT)
Dept: HEMATOLOGY/ONCOLOGY | Facility: CLINIC | Age: 39
End: 2024-01-18
Payer: COMMERCIAL

## 2024-01-25 ENCOUNTER — APPOINTMENT (OUTPATIENT)
Dept: HEMATOLOGY/ONCOLOGY | Facility: CLINIC | Age: 39
End: 2024-01-25
Payer: COMMERCIAL

## 2024-01-31 ENCOUNTER — OFFICE VISIT (OUTPATIENT)
Dept: PRIMARY CARE | Facility: CLINIC | Age: 39
End: 2024-01-31
Payer: COMMERCIAL

## 2024-01-31 ENCOUNTER — LAB (OUTPATIENT)
Dept: LAB | Facility: LAB | Age: 39
End: 2024-01-31
Payer: COMMERCIAL

## 2024-01-31 VITALS
SYSTOLIC BLOOD PRESSURE: 119 MMHG | OXYGEN SATURATION: 97 % | HEART RATE: 106 BPM | WEIGHT: 171 LBS | DIASTOLIC BLOOD PRESSURE: 62 MMHG | HEIGHT: 66 IN | BODY MASS INDEX: 27.48 KG/M2

## 2024-01-31 DIAGNOSIS — Z00.00 ENCOUNTER FOR PREVENTATIVE ADULT HEALTH CARE EXAMINATION: Primary | ICD-10-CM

## 2024-01-31 DIAGNOSIS — D50.0 IRON DEFICIENCY ANEMIA DUE TO CHRONIC BLOOD LOSS: ICD-10-CM

## 2024-01-31 DIAGNOSIS — L73.2 HIDRADENITIS SUPPURATIVA: ICD-10-CM

## 2024-01-31 DIAGNOSIS — F17.200 TOBACCO USE DISORDER: ICD-10-CM

## 2024-01-31 DIAGNOSIS — K50.919 CROHN'S DISEASE WITH COMPLICATION, UNSPECIFIED GASTROINTESTINAL TRACT LOCATION (MULTI): ICD-10-CM

## 2024-01-31 DIAGNOSIS — Z00.00 ENCOUNTER FOR PREVENTATIVE ADULT HEALTH CARE EXAMINATION: ICD-10-CM

## 2024-01-31 DIAGNOSIS — R26.9 GAIT DISORDER: ICD-10-CM

## 2024-01-31 PROBLEM — E66.9 OBESITY: Status: RESOLVED | Noted: 2023-01-27 | Resolved: 2024-01-31

## 2024-01-31 PROBLEM — H52.13 MYOPIC ASTIGMATISM OF BOTH EYES: Status: RESOLVED | Noted: 2023-01-27 | Resolved: 2024-01-31

## 2024-01-31 PROBLEM — H52.203 MYOPIC ASTIGMATISM OF BOTH EYES: Status: RESOLVED | Noted: 2023-01-27 | Resolved: 2024-01-31

## 2024-01-31 PROBLEM — R53.83 FATIGUE: Status: RESOLVED | Noted: 2023-01-27 | Resolved: 2024-01-31

## 2024-01-31 PROBLEM — D50.9 IRON DEFICIENCY ANEMIA: Status: ACTIVE | Noted: 2024-01-31

## 2024-01-31 LAB
25(OH)D3 SERPL-MCNC: 32 NG/ML (ref 30–100)
BASOPHILS # BLD AUTO: 0.08 X10*3/UL (ref 0–0.1)
BASOPHILS NFR BLD AUTO: 0.5 %
EOSINOPHIL # BLD AUTO: 0.18 X10*3/UL (ref 0–0.7)
EOSINOPHIL NFR BLD AUTO: 1.1 %
ERYTHROCYTE [DISTWIDTH] IN BLOOD BY AUTOMATED COUNT: 17 % (ref 11.5–14.5)
HCT VFR BLD AUTO: 27.8 % (ref 41–52)
HGB BLD-MCNC: 8.4 G/DL (ref 13.5–17.5)
IMM GRANULOCYTES # BLD AUTO: 0.08 X10*3/UL (ref 0–0.7)
IMM GRANULOCYTES NFR BLD AUTO: 0.5 % (ref 0–0.9)
LYMPHOCYTES # BLD AUTO: 2.64 X10*3/UL (ref 1.2–4.8)
LYMPHOCYTES NFR BLD AUTO: 16.4 %
MCH RBC QN AUTO: 21.8 PG (ref 26–34)
MCHC RBC AUTO-ENTMCNC: 30.2 G/DL (ref 32–36)
MCV RBC AUTO: 72 FL (ref 80–100)
MONOCYTES # BLD AUTO: 1.18 X10*3/UL (ref 0.1–1)
MONOCYTES NFR BLD AUTO: 7.3 %
NEUTROPHILS # BLD AUTO: 11.91 X10*3/UL (ref 1.2–7.7)
NEUTROPHILS NFR BLD AUTO: 74.2 %
NRBC BLD-RTO: 0 /100 WBCS (ref 0–0)
PLATELET # BLD AUTO: 704 X10*3/UL (ref 150–450)
RBC # BLD AUTO: 3.85 X10*6/UL (ref 4.5–5.9)
WBC # BLD AUTO: 16.1 X10*3/UL (ref 4.4–11.3)

## 2024-01-31 PROCEDURE — 3008F BODY MASS INDEX DOCD: CPT | Performed by: INTERNAL MEDICINE

## 2024-01-31 PROCEDURE — 85025 COMPLETE CBC W/AUTO DIFF WBC: CPT

## 2024-01-31 PROCEDURE — 82306 VITAMIN D 25 HYDROXY: CPT

## 2024-01-31 PROCEDURE — 99214 OFFICE O/P EST MOD 30 MIN: CPT | Performed by: INTERNAL MEDICINE

## 2024-01-31 PROCEDURE — 36415 COLL VENOUS BLD VENIPUNCTURE: CPT

## 2024-01-31 RX ORDER — DOXYCYCLINE 100 MG/1
100 CAPSULE ORAL 2 TIMES DAILY
Qty: 180 CAPSULE | Refills: 0 | Status: SHIPPED | OUTPATIENT
Start: 2024-01-31

## 2024-01-31 RX ORDER — DOXYCYCLINE 100 MG/1
100 CAPSULE ORAL 2 TIMES DAILY
Qty: 90 CAPSULE | Refills: 0 | Status: CANCELLED | OUTPATIENT
Start: 2024-01-31

## 2024-01-31 RX ORDER — VARENICLINE TARTRATE 0.5 MG/1
TABLET, FILM COATED ORAL
Qty: 60 TABLET | Refills: 0 | Status: SHIPPED | OUTPATIENT
Start: 2024-01-31

## 2024-01-31 ASSESSMENT — PATIENT HEALTH QUESTIONNAIRE - PHQ9
SUM OF ALL RESPONSES TO PHQ QUESTIONS 1-9: 11
7. TROUBLE CONCENTRATING ON THINGS, SUCH AS READING THE NEWSPAPER OR WATCHING TELEVISION: SEVERAL DAYS
4. FEELING TIRED OR HAVING LITTLE ENERGY: NEARLY EVERY DAY
1. LITTLE INTEREST OR PLEASURE IN DOING THINGS: NEARLY EVERY DAY
8. MOVING OR SPEAKING SO SLOWLY THAT OTHER PEOPLE COULD HAVE NOTICED. OR THE OPPOSITE, BEING SO FIGETY OR RESTLESS THAT YOU HAVE BEEN MOVING AROUND A LOT MORE THAN USUAL: NOT AT ALL
2. FEELING DOWN, DEPRESSED OR HOPELESS: SEVERAL DAYS
9. THOUGHTS THAT YOU WOULD BE BETTER OFF DEAD, OR OF HURTING YOURSELF: NOT AT ALL
6. FEELING BAD ABOUT YOURSELF - OR THAT YOU ARE A FAILURE OR HAVE LET YOURSELF OR YOUR FAMILY DOWN: NOT AT ALL
10. IF YOU CHECKED OFF ANY PROBLEMS, HOW DIFFICULT HAVE THESE PROBLEMS MADE IT FOR YOU TO DO YOUR WORK, TAKE CARE OF THINGS AT HOME, OR GET ALONG WITH OTHER PEOPLE: SOMEWHAT DIFFICULT
SUM OF ALL RESPONSES TO PHQ9 QUESTIONS 1 AND 2: 4
5. POOR APPETITE OR OVEREATING: NOT AT ALL
3. TROUBLE FALLING OR STAYING ASLEEP OR SLEEPING TOO MUCH: NEARLY EVERY DAY

## 2024-01-31 ASSESSMENT — PAIN SCALES - GENERAL: PAINLEVEL: 6

## 2024-01-31 NOTE — PATIENT INSTRUCTIONS
J Carlos,   I have ordered blood and/or urine tests for you to do today. The lab can be found on this floor (2nd floor) next to the pharmacy across from the elevators.    Smoking   Start VARENICLINE. Take as directed for the first week, then 2 tablets by mouth twice a day   Quit date should be TWO WEEKS after starting the medication.   Send me a message after 1 month for a refill and let me know how you are feeling   Followup 2-3 months

## 2024-01-31 NOTE — PROGRESS NOTES
Subjective   Patient ID: J Carlos Yen is a 38 y.o. male who presents for No chief complaint on file..  HPI  38-year-old female here for follow-up visit, last seen in October.    10/23: prediabetes resolved    PMHx:  -Hidradenitis suppuortiva with lymphedema-not recommended surgical management followed by dermatology placated by likely iron deficiency anemia-seen by dermatology not yet initiated Humira recommended follow-up with hematology given thrombocytosis leukocytosis and anemia. Notes continued sharp sensations in his buttock region despite doxycycline. Scrotal edema has improved but notes continued penile swelling. Has not noted areas to be bleeding Last episode a few weeks ago and only light bleeding.   - Crohn's disease -seen by gastroenterology in remission no evidence of inflammation repeat colonoscopy in 5 years  - Tobacco use-was precontemplative regarding quitting, 7 cigarettes/day x 20 years.   -Prediabetes- on metformin, resolved.   -Vitamin D deficiency-advise daily supplementation with 5000 units  -Iron deficiency anemia with thrombocytosis and leukocytosis and hgb 8.9 - has been taking iron supplement, sometimes forgets to take iron tablets. He is scheduled to see hematology in May.      Social   - Tobacco use, cannot quantify 7 cigarettes/day  - Works at [employer]   - lives at home with wife   Current Outpatient Medications   Medication Instructions    adalimumab (Humira) 40 mg/0.8 mL syringe kit prefilled syringe subcutaneous    chlorhexidine (Hibiclens) 4 % external liquid Topical, Daily PRN, Use as body wash    cyclobenzaprine (FLEXERIL) 5 mg, oral, Nightly PRN    doxycycline (MONODOX) 100 mg, oral, 2 times daily    metFORMIN XR (GLUCOPHAGE-XR) 1,000 mg, oral, Daily with breakfast, Do not crush, chew, or split.    varenicline (Chantix) 0.5 mg tablet Days 1 to 3: Take 1 tablet by mouth once daily Days 4 to 7: Take 1 tablet by mouth twice a day Day 8 and later: Take 2 tablets by mouth twice a  "day Take with full glass of water.        Objective     /62   Pulse 106   Ht 1.676 m (5' 6\")   Wt 77.6 kg (171 lb)   SpO2 97%   BMI 27.60 kg/m²     Physical Exam  General: Alert and oriented, in no apparent distress   HEENT: No conjunctival erythema, no external facial lesions   Lungs: Breathing comfortably  Skin: No evidence of skin breakdown.  Neuro: AAO x 3, answering questions appropriately, no obvious cranial nerve deficits    Assessment/Plan   Problem List Items Addressed This Visit       Crohn's disease (CMS/HCC)     Quiescent, stable, due for repeat colonoscopy in 5 years.          Relevant Orders    Follow Up In [facility] - PCP - Established    Hidradenitis suppurativa    Relevant Medications    doxycycline (Monodox) 100 mg capsule    Other Relevant Orders    Follow Up In [facility] - PCP - Established    Iron deficiency anemia     Maintained on oral iron, has visit with hematology scheduled in May. Continue iron supplement, intermittent adherence.          Tobacco use disorder     Extensively discussed, interested in quitting.   Will initiate Chantix. Potential side effects and management expectations reviewed, quit date 2 weeks after initiation. Discussed the role of adjusting the habit of smoking as well.   If any thoughts of SI, advised to stop the medication asap, call or call 911.   Declines pneumococcal vaccine.          Relevant Medications    varenicline (Chantix) 0.5 mg tablet     Other Visit Diagnoses       Encounter for preventative adult health care examination    -  Primary    Relevant Orders    CBC and Auto Differential    Vitamin D 25-Hydroxy,Total (for eval of Vitamin D levels)    Gait disorder        Relevant Orders    Disability Placard        Health Maintenance   Cancer screening:   - Colonoscopy 9/23 repeat 5 years  Immunizations: declines all     "

## 2024-01-31 NOTE — ASSESSMENT & PLAN NOTE
Extensively discussed, interested in quitting.   Will initiate Chantix. Potential side effects and management expectations reviewed, quit date 2 weeks after initiation. Discussed the role of adjusting the habit of smoking as well.   If any thoughts of SI, advised to stop the medication asap, call or call 911.   Declines pneumococcal vaccine.

## 2024-01-31 NOTE — ASSESSMENT & PLAN NOTE
Maintained on oral iron, has visit with hematology scheduled in May. Continue iron supplement, intermittent adherence.

## 2024-02-03 DIAGNOSIS — M62.830 BACK MUSCLE SPASM: ICD-10-CM

## 2024-02-05 DIAGNOSIS — Z00.00 ENCOUNTER FOR PREVENTATIVE ADULT HEALTH CARE EXAMINATION: ICD-10-CM

## 2024-02-05 DIAGNOSIS — D50.0 IRON DEFICIENCY ANEMIA DUE TO CHRONIC BLOOD LOSS: Primary | ICD-10-CM

## 2024-02-05 DIAGNOSIS — M62.830 BACK MUSCLE SPASM: ICD-10-CM

## 2024-02-05 DIAGNOSIS — R73.03 PREDIABETES: ICD-10-CM

## 2024-02-05 RX ORDER — CYCLOBENZAPRINE HCL 5 MG
5 TABLET ORAL NIGHTLY PRN
Qty: 10 TABLET | Refills: 0 | OUTPATIENT
Start: 2024-02-05

## 2024-02-05 RX ORDER — METFORMIN HYDROCHLORIDE 500 MG/1
1000 TABLET, EXTENDED RELEASE ORAL
Qty: 180 TABLET | Refills: 1 | Status: SHIPPED | OUTPATIENT
Start: 2024-02-05 | End: 2025-02-04

## 2024-02-05 RX ORDER — CYCLOBENZAPRINE HCL 5 MG
5 TABLET ORAL NIGHTLY PRN
Qty: 30 TABLET | Refills: 0 | Status: SHIPPED | OUTPATIENT
Start: 2024-02-05 | End: 2024-03-12

## 2024-02-06 ENCOUNTER — APPOINTMENT (OUTPATIENT)
Dept: UROLOGY | Facility: CLINIC | Age: 39
End: 2024-02-06
Payer: COMMERCIAL

## 2024-02-06 NOTE — PROGRESS NOTES
J Carlos presents for a follow up visit.  The patient’s EMR has been reviewed.  Lives in [place], [place].  Occupation: [employer]    Hx of Crohn's disease and hidradenitis suppurativa.  Previously evaluated by Dr. Cortes and Dr. Ball  Referred to me by Dr. Cortes for consideration involving reconstructive surgery.    SUBJECTIVE: HPI   TODAY (02/05/24)  ***    TO REVIEW: Last visit (08/01/23)  Presenting today with complaints of significant genital hidradenitis.  Having undergone recent I&D with general surgery.   However, still complaining of persistent penile and scrotal edema.   Denies hydroceles or areas of active drainage.      Reports that he has been evaluated Dermatology and treated with Humira in the past. He experienced good results while on Humira in regards to management of his Hidradenitis.However, he has had to change providers recently and has had to discontinue.    Past medical, surgical, family and social history in the chart was reviewed and is accurate including any additions to what is in this HPI.    Review of Systems   Constitutional: denies any unintentional weight loss or change in strength.  Integumentary: denies any rashes or pruritus.  Eyes: denies any double vision or eye pain.  Ear/Nose/Mouth/Throat: denies any nosebleeds or gum bleeds.  Cardiovascular: denies any chest pain or syncope.  Respiratory: denies hemoptysis.  Gastrointestinal: denies nausea or vomiting.  Musculoskeletal: denies muscle cramping or weakness.  Neurologic: denies convulsions or seizures.  Hematologic/Lymphatic: denies bleeding tendencies.  Endocrine: denies heat/cold intolerance.  All other systems have been reviewed and are negative unless otherwise noted in the HPI.    OBJECTIVE:  There were no vitals taken for this visit.  Physical Exam   Constitutional: No obvious distress.  Eyes: Non-injected conjunctiva, sclera clear, EOMI.  Ears/Nose/Mouth/Throat: No obvious drainage per ears or  nose.  Cardiovascular: Extremities are warm and well perfused. No edema, cyanosis or pallor.  Respiratory: No audible wheezing/stridor; respirations do not appear labored.  Gastrointestinal: Abdomen soft, not distended.  Musculoskeletal: Normal ROM of extremities.  Skin: No obvious rashes or open sores.  Neurologic: Alert and oriented, CN 2-12 grossly intact.  Psychiatric: Answers questions appropriately with normal affect.  Hematologic/Lymphatic/Immunologic: No obvious bruises or sites of spontaneous bleeding.  Genitourinary: No CVA tenderness, bladder not palpable.     Labs and imaging:  Lab Results   Component Value Date    WBC 16.1 (H) 01/31/2024    HGB 8.4 (L) 01/31/2024    HCT 27.8 (L) 01/31/2024     (H) 01/31/2024    CHOL 105 03/07/2023    TRIG 56 03/07/2023    HDL 33.0 (A) 03/07/2023    ALT 17 11/30/2023    AST 16 11/30/2023     11/30/2023    K 4.0 11/30/2023     11/30/2023    CREATININE 0.74 11/30/2023    BUN 7 11/30/2023    CO2 27 11/30/2023    TSH 1.40 03/07/2023    HGBA1C 5.4 10/31/2023     ASSESSMENT:  Problem List Items Addressed This Visit    None    1. Hidradenitis suppurativa  2. Crohn's Disease  3. Abnormal urinalysis     PLAN:    TO REVIEW:  Will send urine micro, hematuria or contaminant may be from skin contact with phimosis/edema     1  For his genital hidradenitis suppurativa.   Discussed non-surgical vs surgical treatment options for management.  I recommend that he proceed with a Dermatology referral and re-establish care.  He reports good results with being on Humira in the past.   I recommend that he consider restarting this prior to proceeding with reconstructive surgery.   Will refer to Dermatology for an evaluation.      Plan to RTC in 6 months for reassessment.    All questions were answered to the patient’s satisfaction.  Patient agrees with the plan and wishes to proceed.  Follow-up will be scheduled appropriately.     Scribed for Dr. Oliverio Alfaro by Branden  Tori.  I, Dr. Oliverio Alfaro have personally reviewed and agreed with the information entered by the Virtual Scribe. 02/06/24.

## 2024-02-07 ENCOUNTER — TELEPHONE (OUTPATIENT)
Dept: HEMATOLOGY/ONCOLOGY | Facility: HOSPITAL | Age: 39
End: 2024-02-07
Payer: COMMERCIAL

## 2024-02-07 NOTE — TELEPHONE ENCOUNTER
RN called patient to get him scheduled with Hematology. RN left message on voice mail regarding this and asked him to call back. Call back instructions reviewed.

## 2024-02-08 ENCOUNTER — PATIENT OUTREACH (OUTPATIENT)
Dept: HEMATOLOGY/ONCOLOGY | Facility: HOSPITAL | Age: 39
End: 2024-02-08
Payer: COMMERCIAL

## 2024-02-08 ENCOUNTER — TELEPHONE (OUTPATIENT)
Dept: HEMATOLOGY/ONCOLOGY | Facility: HOSPITAL | Age: 39
End: 2024-02-08
Payer: COMMERCIAL

## 2024-02-08 DIAGNOSIS — D47.3 ESSENTIAL THROMBOCYTOSIS (MULTI): Primary | ICD-10-CM

## 2024-02-08 DIAGNOSIS — D50.9 IRON DEFICIENCY ANEMIA, UNSPECIFIED IRON DEFICIENCY ANEMIA TYPE: ICD-10-CM

## 2024-02-08 NOTE — PROGRESS NOTES
RN talked to patient's spouse. Patient was referred by Dr. Lanier for anemia and elevated platelet count. Patient also has a history of hidrenitis and Dr. Lanier thinks that this is causing blood loss and inflammation. Patient will see Dr. Riggins on 3/12 at noon. Patient's wife aware of place, date and time. Patient can call for any questions or concerns. Call back instructions reviewed.

## 2024-02-09 ENCOUNTER — TELEPHONE (OUTPATIENT)
Dept: HEMATOLOGY/ONCOLOGY | Facility: HOSPITAL | Age: 39
End: 2024-02-09
Payer: COMMERCIAL

## 2024-02-22 ENCOUNTER — APPOINTMENT (OUTPATIENT)
Dept: HEMATOLOGY/ONCOLOGY | Facility: HOSPITAL | Age: 39
End: 2024-02-22
Payer: COMMERCIAL

## 2024-02-22 ENCOUNTER — OFFICE VISIT (OUTPATIENT)
Dept: DERMATOLOGY | Facility: CLINIC | Age: 39
End: 2024-02-22
Payer: COMMERCIAL

## 2024-02-22 DIAGNOSIS — L73.2 HIDRADENITIS SUPPURATIVA: ICD-10-CM

## 2024-02-22 PROCEDURE — 3008F BODY MASS INDEX DOCD: CPT | Performed by: DERMATOLOGY

## 2024-02-22 PROCEDURE — 99213 OFFICE O/P EST LOW 20 MIN: CPT | Performed by: DERMATOLOGY

## 2024-02-22 RX ORDER — ADALIMUMAB 4 MG/ML
KIT INJECTION
Qty: 3 EACH | Refills: 0 | Status: SHIPPED | OUTPATIENT
Start: 2024-02-22 | End: 2024-03-13 | Stop reason: SDUPTHER

## 2024-02-22 RX ORDER — ADALIMUMAB 40MG/0.4ML
40 KIT SUBCUTANEOUS
Qty: 4 EACH | Refills: 11 | Status: SHIPPED | OUTPATIENT
Start: 2024-02-22 | End: 2024-03-13 | Stop reason: SDUPTHER

## 2024-02-22 NOTE — PROGRESS NOTES
Subjective     J Carlos Yen is a 38 y.o. male who presents for the following: Hidradenitis Suppurativa (Never got re-started on the Humira. On Doxycycline and BPO wash ). He never got the Humira. Symptoms are unchanged.     Review of Systems:  No other skin or systemic complaints other than what is documented elsewhere in the note.    The following portions of the chart were reviewed this encounter and updated as appropriate:          Skin Cancer History  No skin cancer on file.      Specialty Problems          Dermatology Problems    Groin rash    Hidradenitis suppurativa        Objective   Well appearing patient in no apparent distress; mood and affect are within normal limits.    A focused skin examination was performed. All findings within normal limits unless otherwise noted below.    Assessment/Plan   1. Hidradenitis suppurativa    Related Procedures  Follow Up In Dermatology - Established Patient    Related Medications  chlorhexidine (Hibiclens) 4 % external liquid  Apply topically once daily as needed for wound care. Use as body wash    doxycycline (Monodox) 100 mg capsule  Take 1 capsule (100 mg) by mouth 2 times a day.

## 2024-02-28 DIAGNOSIS — D53.8 OTHER SPECIFIED NUTRITIONAL ANEMIAS: Primary | ICD-10-CM

## 2024-02-29 ENCOUNTER — OFFICE VISIT (OUTPATIENT)
Dept: HEMATOLOGY/ONCOLOGY | Facility: HOSPITAL | Age: 39
End: 2024-02-29
Payer: COMMERCIAL

## 2024-02-29 ENCOUNTER — LAB (OUTPATIENT)
Dept: LAB | Facility: HOSPITAL | Age: 39
End: 2024-02-29
Payer: COMMERCIAL

## 2024-02-29 ENCOUNTER — APPOINTMENT (OUTPATIENT)
Dept: HEMATOLOGY/ONCOLOGY | Facility: HOSPITAL | Age: 39
End: 2024-02-29
Payer: COMMERCIAL

## 2024-02-29 VITALS
SYSTOLIC BLOOD PRESSURE: 132 MMHG | HEART RATE: 117 BPM | DIASTOLIC BLOOD PRESSURE: 71 MMHG | WEIGHT: 175.27 LBS | RESPIRATION RATE: 18 BRPM | BODY MASS INDEX: 28.29 KG/M2 | OXYGEN SATURATION: 97 % | TEMPERATURE: 97 F

## 2024-02-29 DIAGNOSIS — D53.8 OTHER SPECIFIED NUTRITIONAL ANEMIAS: ICD-10-CM

## 2024-02-29 DIAGNOSIS — D75.839 THROMBOCYTOSIS: ICD-10-CM

## 2024-02-29 DIAGNOSIS — D75.839 THROMBOCYTOSIS: Primary | ICD-10-CM

## 2024-02-29 DIAGNOSIS — D50.9 IRON DEFICIENCY ANEMIA, UNSPECIFIED IRON DEFICIENCY ANEMIA TYPE: ICD-10-CM

## 2024-02-29 LAB
ALBUMIN SERPL BCP-MCNC: 3.4 G/DL (ref 3.4–5)
ALP SERPL-CCNC: 77 U/L (ref 33–120)
ALT SERPL W P-5'-P-CCNC: 19 U/L (ref 10–52)
ANION GAP SERPL CALC-SCNC: 15 MMOL/L (ref 10–20)
AST SERPL W P-5'-P-CCNC: 17 U/L (ref 9–39)
BASOPHILS # BLD AUTO: 0.07 X10*3/UL (ref 0–0.1)
BASOPHILS NFR BLD AUTO: 0.5 %
BILIRUB SERPL-MCNC: 0.3 MG/DL (ref 0–1.2)
BUN SERPL-MCNC: 7 MG/DL (ref 6–23)
CALCIUM SERPL-MCNC: 9 MG/DL (ref 8.6–10.6)
CHLORIDE SERPL-SCNC: 98 MMOL/L (ref 98–107)
CO2 SERPL-SCNC: 24 MMOL/L (ref 21–32)
CREAT SERPL-MCNC: 0.85 MG/DL (ref 0.5–1.3)
CRP SERPL-MCNC: 12.02 MG/DL
EGFRCR SERPLBLD CKD-EPI 2021: >90 ML/MIN/1.73M*2
EOSINOPHIL # BLD AUTO: 0.09 X10*3/UL (ref 0–0.7)
EOSINOPHIL NFR BLD AUTO: 0.6 %
ERYTHROCYTE [DISTWIDTH] IN BLOOD BY AUTOMATED COUNT: 17.2 % (ref 11.5–14.5)
ERYTHROCYTE [SEDIMENTATION RATE] IN BLOOD BY WESTERGREN METHOD: >130 MM/H (ref 0–15)
FERRITIN SERPL-MCNC: 379 NG/ML (ref 20–300)
FOLATE SERPL-MCNC: 10.8 NG/ML
GLUCOSE SERPL-MCNC: 91 MG/DL (ref 74–99)
HCT VFR BLD AUTO: 27.9 % (ref 41–52)
HGB BLD-MCNC: 8.7 G/DL (ref 13.5–17.5)
HGB RETIC QN: 22 PG (ref 28–38)
IMM GRANULOCYTES # BLD AUTO: 0.07 X10*3/UL (ref 0–0.7)
IMM GRANULOCYTES NFR BLD AUTO: 0.5 % (ref 0–0.9)
IMMATURE RETIC FRACTION: 14.7 %
INR PPP: 1.2 (ref 0.9–1.1)
IRON SATN MFR SERPL: ABNORMAL %
IRON SERPL-MCNC: <10 UG/DL (ref 35–150)
LDH SERPL L TO P-CCNC: 84 U/L (ref 84–246)
LYMPHOCYTES # BLD AUTO: 1.6 X10*3/UL (ref 1.2–4.8)
LYMPHOCYTES NFR BLD AUTO: 11.1 %
MCH RBC QN AUTO: 21.9 PG (ref 26–34)
MCHC RBC AUTO-ENTMCNC: 31.2 G/DL (ref 32–36)
MCV RBC AUTO: 70 FL (ref 80–100)
MONOCYTES # BLD AUTO: 1.78 X10*3/UL (ref 0.1–1)
MONOCYTES NFR BLD AUTO: 12.3 %
NEUTROPHILS # BLD AUTO: 10.85 X10*3/UL (ref 1.2–7.7)
NEUTROPHILS NFR BLD AUTO: 75 %
NRBC BLD-RTO: 0 /100 WBCS (ref 0–0)
PLATELET # BLD AUTO: 661 X10*3/UL (ref 150–450)
POTASSIUM SERPL-SCNC: 4.4 MMOL/L (ref 3.5–5.3)
PROT SERPL-MCNC: 7.8 G/DL (ref 6.4–8.2)
PROT SERPL-MCNC: 7.8 G/DL (ref 6.4–8.2)
PROTHROMBIN TIME: 13.7 SECONDS (ref 9.8–12.8)
RBC # BLD AUTO: 3.97 X10*6/UL (ref 4.5–5.9)
RETICS #: 0.05 X10*6/UL (ref 0.02–0.12)
RETICS/RBC NFR AUTO: 1.2 % (ref 0.5–2)
SODIUM SERPL-SCNC: 133 MMOL/L (ref 136–145)
TIBC SERPL-MCNC: ABNORMAL UG/DL
UIBC SERPL-MCNC: 229 UG/DL (ref 110–370)
URATE SERPL-MCNC: 5.6 MG/DL (ref 4–7.5)
VIT B12 SERPL-MCNC: 326 PG/ML (ref 211–911)
WBC # BLD AUTO: 14.5 X10*3/UL (ref 4.4–11.3)

## 2024-02-29 PROCEDURE — 84165 PROTEIN E-PHORESIS SERUM: CPT | Performed by: STUDENT IN AN ORGANIZED HEALTH CARE EDUCATION/TRAINING PROGRAM

## 2024-02-29 PROCEDURE — 84165 PROTEIN E-PHORESIS SERUM: CPT

## 2024-02-29 PROCEDURE — 86140 C-REACTIVE PROTEIN: CPT

## 2024-02-29 PROCEDURE — 99205 OFFICE O/P NEW HI 60 MIN: CPT | Performed by: INTERNAL MEDICINE

## 2024-02-29 PROCEDURE — 3008F BODY MASS INDEX DOCD: CPT | Performed by: INTERNAL MEDICINE

## 2024-02-29 PROCEDURE — 85652 RBC SED RATE AUTOMATED: CPT

## 2024-02-29 PROCEDURE — 99215 OFFICE O/P EST HI 40 MIN: CPT | Performed by: INTERNAL MEDICINE

## 2024-02-29 PROCEDURE — 84075 ASSAY ALKALINE PHOSPHATASE: CPT

## 2024-02-29 PROCEDURE — 82607 VITAMIN B-12: CPT

## 2024-02-29 PROCEDURE — 85610 PROTHROMBIN TIME: CPT

## 2024-02-29 PROCEDURE — 85025 COMPLETE CBC W/AUTO DIFF WBC: CPT

## 2024-02-29 PROCEDURE — 84550 ASSAY OF BLOOD/URIC ACID: CPT

## 2024-02-29 PROCEDURE — 83615 LACTATE (LD) (LDH) ENZYME: CPT

## 2024-02-29 PROCEDURE — 82746 ASSAY OF FOLIC ACID SERUM: CPT

## 2024-02-29 PROCEDURE — 83521 IG LIGHT CHAINS FREE EACH: CPT

## 2024-02-29 PROCEDURE — 36415 COLL VENOUS BLD VENIPUNCTURE: CPT

## 2024-02-29 PROCEDURE — 85045 AUTOMATED RETICULOCYTE COUNT: CPT

## 2024-02-29 PROCEDURE — 83540 ASSAY OF IRON: CPT

## 2024-02-29 PROCEDURE — 84155 ASSAY OF PROTEIN SERUM: CPT | Mod: 59

## 2024-02-29 PROCEDURE — 82728 ASSAY OF FERRITIN: CPT

## 2024-02-29 ASSESSMENT — PATIENT HEALTH QUESTIONNAIRE - PHQ9
SUM OF ALL RESPONSES TO PHQ9 QUESTIONS 1 AND 2: 0
1. LITTLE INTEREST OR PLEASURE IN DOING THINGS: NOT AT ALL
2. FEELING DOWN, DEPRESSED OR HOPELESS: NOT AT ALL

## 2024-02-29 ASSESSMENT — ENCOUNTER SYMPTOMS
DEPRESSION: 0
LOSS OF SENSATION IN FEET: 0
OCCASIONAL FEELINGS OF UNSTEADINESS: 0

## 2024-02-29 ASSESSMENT — COLUMBIA-SUICIDE SEVERITY RATING SCALE - C-SSRS
2. HAVE YOU ACTUALLY HAD ANY THOUGHTS OF KILLING YOURSELF?: NO
1. IN THE PAST MONTH, HAVE YOU WISHED YOU WERE DEAD OR WISHED YOU COULD GO TO SLEEP AND NOT WAKE UP?: NO
6. HAVE YOU EVER DONE ANYTHING, STARTED TO DO ANYTHING, OR PREPARED TO DO ANYTHING TO END YOUR LIFE?: NO

## 2024-02-29 ASSESSMENT — PAIN SCALES - GENERAL: PAINLEVEL: 0-NO PAIN

## 2024-03-01 LAB
KAPPA LC SERPL-MCNC: 8.11 MG/DL (ref 0.33–1.94)
KAPPA LC/LAMBDA SER: 1.6 {RATIO} (ref 0.26–1.65)
LAMBDA LC SERPL-MCNC: 5.06 MG/DL (ref 0.57–2.63)

## 2024-03-01 NOTE — PROGRESS NOTES
HEMATOLOGY/ONCOLOGY CLINIC NOTE      HPI:  Mr. Yen is 38 years old male with h/o Crohn's disease in remission for the past 5 years, Hidradenitis Suppurativa c/b scrotal edema and sacral ulcers, followed with dermatology, on Doxycycline, planned for Humera. He was referred to hematology clinic for worsening anemia and thrombocytosis.  Patient is in severe pain from his HS, can't sit still, he states feeling tired most of the time, and he might have lost few pounds over the last few weeks. He denies fevers or night sweats, no headache or blurry vision. No skin rash. Denies h/o blood clots. He states being told he had BIBIANA and was given oral iron but he's not taking them regularly.     Oncology History and Treatment synopsis  Oncology History    No history exists.       PMH:  Past Medical History:   Diagnosis Date    Allergic     Cataract     Inflammatory bowel disease    PMHx:  - Hidradenitis suppuortiva   - Crohn's disease -seen by gastroenterology in remission no evidence of inflammation repeat colonoscopy in 5 years  - Tobacco use-was precontemplative regarding quitting, 7 cigarettes/day x 20 years.   - Prediabetes- on metformin, resolved.   - Vitamin D deficiency-advise daily supplementation with 5000 units  - Iron deficiency anemia with thrombocytosis and leukocytosis and hgb 8.9 - has been taking iron supplement, sometimes forgets to take iron tablets. He is scheduled to see hematology in May.     PSH:  Past Surgical History:   Procedure Laterality Date    APPENDECTOMY  05/18/2016    Appendectomy       SH:  Social History     Tobacco Use    Smoking status: Every Day     Packs/day: 0.50     Years: 21.00     Additional pack years: 0.00     Total pack years: 10.50     Types: Cigarettes    Smokeless tobacco: Never   Substance Use Topics    Alcohol use: Yes     Alcohol/week: 2.0 standard drinks of alcohol     Types: 1 Glasses of wine, 1 Shots of liquor per week      reports current drug use. Drug:  Marijuana.      CURRENT MEDS:  Current Outpatient Medications on File Prior to Visit   Medication Sig Dispense Refill    adalimumab (Humira) 40 mg/0.8 mL syringe kit prefilled syringe Inject under the skin.      adalimumab (Humira,CF, Pen Bicl-Rh-Qktp HS) 80 mg/0.8 mL-40 mg/0.4 mL pen injector kit pen-injector starter kit Inject 160mg on day 1, then inject 80mg 2 weeks later. Loading dose. 3 each 0    adalimumab (Humira,CF, Pen) 40 mg/0.4 mL pen injector kit pen-injector Inject 1 Pen (40 mg) under the skin 1 (one) time per week. Maintenance dose. 4 each 11    chlorhexidine (Hibiclens) 4 % external liquid Apply topically once daily as needed for wound care. Use as body wash 300 mL 3    cyclobenzaprine (Flexeril) 5 mg tablet Take 1 tablet (5 mg) by mouth as needed at bedtime for muscle spasms. 30 tablet 0    doxycycline (Monodox) 100 mg capsule Take 1 capsule (100 mg) by mouth 2 times a day. 180 capsule 0    metFORMIN XR (Glucophage-XR) 500 mg 24 hr tablet Take 2 tablets (1,000 mg) by mouth once daily with breakfast. Do not crush, chew, or split. 180 tablet 1    varenicline (Chantix) 0.5 mg tablet Days 1 to 3: Take 1 tablet by mouth once daily Days 4 to 7: Take 1 tablet by mouth twice a day Day 8 and later: Take 2 tablets by mouth twice a day Take with full glass of water. 60 tablet 0     No current facility-administered medications on file prior to visit.       PHYSICAL EXAM:  /71 (BP Location: Left arm, Patient Position: Sitting, BP Cuff Size: Adult)   Pulse (!) 117   Temp 36.1 °C (97 °F) (Temporal)   Resp 18   Wt 79.5 kg (175 lb 4.3 oz)   SpO2 97%   BMI 28.29 kg/m²     Physical Exam  Constitutional:       Appearance: He is ill-appearing.   HENT:      Head: Normocephalic.      Nose: Nose normal.      Mouth/Throat:      Mouth: Mucous membranes are moist.   Eyes:      Pupils: Pupils are equal, round, and reactive to light.   Cardiovascular:      Rate and Rhythm: Normal rate and regular rhythm.      Pulses:  "Normal pulses.   Pulmonary:      Effort: Pulmonary effort is normal.   Abdominal:      General: Abdomen is flat.      Palpations: Abdomen is soft. There is no mass.   Musculoskeletal:         General: Normal range of motion.      Cervical back: Normal range of motion and neck supple.   Skin:     General: Skin is warm.      Coloration: Skin is pale. Skin is not jaundiced.      Findings: No bruising, erythema or rash.   Neurological:      General: No focal deficit present.      Mental Status: He is alert.   Psychiatric:         Mood and Affect: Mood normal.             LAB DATA:    No components found for: \"CBC\"  Lab Results   Component Value Date    WBC 14.5 (H) 02/29/2024    WBC 16.1 (H) 01/31/2024    WBC 15.3 (H) 11/30/2023    WBC 16.5 (H) 10/31/2023    WBC CANCELED 05/12/2023     Lab Results   Component Value Date    HGB 8.7 (L) 02/29/2024    HGB 8.4 (L) 01/31/2024    HGB 8.9 (L) 11/30/2023    HGB 8.5 (L) 10/31/2023    HGB CANCELED 05/12/2023     Lab Results   Component Value Date     (H) 02/29/2024     (H) 01/31/2024     (H) 11/30/2023     (H) 10/31/2023    PLT CANCELED 05/12/2023       Lab Results   Component Value Date    GLUCOSE 91 02/29/2024    CALCIUM 9.0 02/29/2024     (L) 02/29/2024    K 4.4 02/29/2024    CO2 24 02/29/2024    CL 98 02/29/2024    BUN 7 02/29/2024    CREATININE 0.85 02/29/2024     Lab Results   Component Value Date    CREATININE 0.85 02/29/2024    CREATININE 0.74 11/30/2023    CREATININE CANCELED 05/12/2023    CREATININE 1.03 05/11/2023    CREATININE 1.04 05/10/2023       Lab Results   Component Value Date    ALT 19 02/29/2024    AST 17 02/29/2024    ALKPHOS 77 02/29/2024    BILITOT 0.3 02/29/2024       ASSESSMENT AND PLAN    Mr. Yen is 38 years old male with h/o Crohn's disease in remission for the past 5 years, Hidradenitis Suppurativa c/b scrotal edema and sacral ulcers, followed with dermatology, on Doxycycline, planned for Humera. He was referred to " hematology clinic for worsening anemia and thrombocytosis.  His anemia is chronic dating back to at least 2019, however it got worse to HGB 8-9 in May 2023, Anemia is microcytic with gradual decline in his MCV over the last year. Iron studies last tested in May 2023 with low iron but elevated Ferritin, likely in the setting of active inflammation.  His PLT and Neutrophils have been consistently elevated since that time as well, which coincide with his active HS and with elevated inflammatory markers.   The etiology of both is anemia and thrombocytosis is likely related to the active inflammation, and controlling the HS would help, there might as well be some iron deficiency that is complicating the picture, but hard to detect due to elevated Ferritin from inflammation. ET or MF although less likely in this situation (specially with no splenomegaly detected on exam) remain on the differential if he does not improve.     His Blood smear was reviewed today 2/29/24 and showed microcytic RBCs with few target cells, no nucleated RBCs noted, increased PLT with some large PLTs, increased neutrophils, no blasts or immature cells.    Recommendations:  - Will repeat Iron, Tsat, TIBC, Ferritin, Retic  - B12, Folate  - ESR, CRP  - SPEP/IF + FLC    - If there is evidence of BIBIANA, he might benefit from IV iron as inflammation can affect oral absorption, and would highly recommend his dermatology team to start treatment for HS to control the inflammation as that would ultimately help his blood counts as well.  - If his counts don't improve with iron and inflammation control then we can check JAK2/CALR/MPL mutations on peripheral blood     Discussed with Dr. Stephanie MD  [id number] Hematology Oncology Fellow    Duration of Visit  This is a New visit. I spent 60 minutes in the care of this patient, including preparing, chart and results review, face time and charting.     I saw and evaluated the patient. I personally  obtained the key and critical portions of the history and physical exam or was physically present for key and critical portions performed by the resident/fellow. I reviewed the resident/fellow's documentation and discussed the patient with the resident/fellow. I agree with the resident/fellow's medical decision making as documented in the note.      Estimated iron deficit ~800 mg. Will replenish 200 mg x3    Suma Almeida MD PhD

## 2024-03-04 LAB
ALBUMIN: 2.8 G/DL (ref 3.4–5)
ALPHA 1 GLOBULIN: 0.6 G/DL (ref 0.2–0.6)
ALPHA 2 GLOBULIN: 1 G/DL (ref 0.4–1.1)
BETA GLOBULIN: 1.1 G/DL (ref 0.5–1.2)
GAMMA GLOBULIN: 2.2 G/DL (ref 0.5–1.4)
PATH REVIEW-SERUM PROTEIN ELECTROPHORESIS: ABNORMAL
PROTEIN ELECTROPHORESIS COMMENT: ABNORMAL

## 2024-03-05 ENCOUNTER — TELEPHONE (OUTPATIENT)
Dept: HEMATOLOGY/ONCOLOGY | Facility: HOSPITAL | Age: 39
End: 2024-03-05
Payer: COMMERCIAL

## 2024-03-05 RX ORDER — DIPHENHYDRAMINE HYDROCHLORIDE 50 MG/ML
50 INJECTION INTRAMUSCULAR; INTRAVENOUS AS NEEDED
Status: CANCELLED | OUTPATIENT
Start: 2024-03-12

## 2024-03-05 RX ORDER — FAMOTIDINE 10 MG/ML
20 INJECTION INTRAVENOUS ONCE AS NEEDED
Status: CANCELLED | OUTPATIENT
Start: 2024-03-12

## 2024-03-05 RX ORDER — EPINEPHRINE 0.3 MG/.3ML
0.3 INJECTION SUBCUTANEOUS EVERY 5 MIN PRN
Status: CANCELLED | OUTPATIENT
Start: 2024-03-12

## 2024-03-05 RX ORDER — ALBUTEROL SULFATE 0.83 MG/ML
3 SOLUTION RESPIRATORY (INHALATION) AS NEEDED
Status: CANCELLED | OUTPATIENT
Start: 2024-03-12

## 2024-03-05 NOTE — TELEPHONE ENCOUNTER
Call placed to patient in regards to need for IV iron infusion. Patient made aware that iron infusion to be scheduled Tuesday 3/12 in the stem cell unit after his MD appt. Also patient to be scheduled for Iv iron on 3/15 and 3/18 for

## 2024-03-11 DIAGNOSIS — M62.830 BACK MUSCLE SPASM: ICD-10-CM

## 2024-03-12 ENCOUNTER — TREATMENT (OUTPATIENT)
Dept: OTHER | Facility: HOSPITAL | Age: 39
End: 2024-03-12
Payer: COMMERCIAL

## 2024-03-12 ENCOUNTER — APPOINTMENT (OUTPATIENT)
Dept: HEMATOLOGY/ONCOLOGY | Facility: HOSPITAL | Age: 39
End: 2024-03-12
Payer: COMMERCIAL

## 2024-03-12 ENCOUNTER — OFFICE VISIT (OUTPATIENT)
Dept: HEMATOLOGY/ONCOLOGY | Facility: HOSPITAL | Age: 39
End: 2024-03-12
Payer: COMMERCIAL

## 2024-03-12 VITALS
RESPIRATION RATE: 18 BRPM | DIASTOLIC BLOOD PRESSURE: 56 MMHG | TEMPERATURE: 99.5 F | HEART RATE: 100 BPM | SYSTOLIC BLOOD PRESSURE: 123 MMHG | OXYGEN SATURATION: 100 %

## 2024-03-12 VITALS
SYSTOLIC BLOOD PRESSURE: 130 MMHG | BODY MASS INDEX: 28.31 KG/M2 | TEMPERATURE: 96.3 F | WEIGHT: 175.4 LBS | RESPIRATION RATE: 16 BRPM | HEART RATE: 106 BPM | DIASTOLIC BLOOD PRESSURE: 64 MMHG | OXYGEN SATURATION: 100 %

## 2024-03-12 DIAGNOSIS — D47.3 ESSENTIAL THROMBOCYTOSIS (MULTI): ICD-10-CM

## 2024-03-12 DIAGNOSIS — D50.9 IRON DEFICIENCY ANEMIA, UNSPECIFIED IRON DEFICIENCY ANEMIA TYPE: ICD-10-CM

## 2024-03-12 DIAGNOSIS — D75.839 THROMBOCYTOSIS: ICD-10-CM

## 2024-03-12 LAB
ERYTHROCYTE [DISTWIDTH] IN BLOOD BY AUTOMATED COUNT: 17.2 % (ref 11.5–14.5)
FERRITIN SERPL-MCNC: 308 NG/ML (ref 20–300)
HCT VFR BLD AUTO: 26.5 % (ref 41–52)
HGB BLD-MCNC: 8 G/DL (ref 13.5–17.5)
IRON SATN MFR SERPL: 6 % (ref 25–45)
IRON SERPL-MCNC: 13 UG/DL (ref 35–150)
MCH RBC QN AUTO: 21.4 PG (ref 26–34)
MCHC RBC AUTO-ENTMCNC: 30.2 G/DL (ref 32–36)
MCV RBC AUTO: 71 FL (ref 80–100)
NRBC BLD-RTO: 0 /100 WBCS (ref 0–0)
PLATELET # BLD AUTO: 747 X10*3/UL (ref 150–450)
RBC # BLD AUTO: 3.73 X10*6/UL (ref 4.5–5.9)
TIBC SERPL-MCNC: 207 UG/DL (ref 240–445)
UIBC SERPL-MCNC: 194 UG/DL (ref 110–370)
WBC # BLD AUTO: 16 X10*3/UL (ref 4.4–11.3)

## 2024-03-12 PROCEDURE — 96374 THER/PROPH/DIAG INJ IV PUSH: CPT

## 2024-03-12 PROCEDURE — 99213 OFFICE O/P EST LOW 20 MIN: CPT | Performed by: INTERNAL MEDICINE

## 2024-03-12 PROCEDURE — 85027 COMPLETE CBC AUTOMATED: CPT

## 2024-03-12 PROCEDURE — 3008F BODY MASS INDEX DOCD: CPT | Performed by: INTERNAL MEDICINE

## 2024-03-12 PROCEDURE — 2500000004 HC RX 250 GENERAL PHARMACY W/ HCPCS (ALT 636 FOR OP/ED): Performed by: INTERNAL MEDICINE

## 2024-03-12 PROCEDURE — 82728 ASSAY OF FERRITIN: CPT

## 2024-03-12 PROCEDURE — 83540 ASSAY OF IRON: CPT

## 2024-03-12 RX ORDER — ALBUTEROL SULFATE 0.83 MG/ML
3 SOLUTION RESPIRATORY (INHALATION) AS NEEDED
Status: CANCELLED | OUTPATIENT
Start: 2024-03-15

## 2024-03-12 RX ORDER — CYCLOBENZAPRINE HCL 5 MG
5 TABLET ORAL NIGHTLY PRN
Qty: 30 TABLET | Refills: 0 | Status: SHIPPED | OUTPATIENT
Start: 2024-03-12

## 2024-03-12 RX ORDER — EPINEPHRINE 0.3 MG/.3ML
0.3 INJECTION SUBCUTANEOUS EVERY 5 MIN PRN
Status: CANCELLED | OUTPATIENT
Start: 2024-03-15

## 2024-03-12 RX ORDER — FAMOTIDINE 10 MG/ML
20 INJECTION INTRAVENOUS ONCE AS NEEDED
Status: CANCELLED | OUTPATIENT
Start: 2024-03-15

## 2024-03-12 RX ORDER — DIPHENHYDRAMINE HYDROCHLORIDE 50 MG/ML
50 INJECTION INTRAMUSCULAR; INTRAVENOUS AS NEEDED
Status: CANCELLED | OUTPATIENT
Start: 2024-03-15

## 2024-03-12 RX ADMIN — IRON SUCROSE 200 MG: 20 INJECTION, SOLUTION INTRAVENOUS at 14:48

## 2024-03-12 ASSESSMENT — PAIN SCALES - GENERAL: PAINLEVEL: 6

## 2024-03-12 NOTE — PROGRESS NOTES
Pt ambulated to SCT unit without assistance. Pt denies complaints or pain today. Blood drawn via 22G IV placed peripherally in his RAC and sent to lab. Pt received and tolerated Iron sucrose injection over 5 minutes. Pt remained on the unit for 30 minutes post injection, vitals remained stable. Pt required no further interventions, IV flushed and removed. Catheter intact, dressing applied. Pt ambulated off unit without complaints or assistance.

## 2024-03-12 NOTE — PROGRESS NOTES
HEMATOLOGY/ONCOLOGY CLINIC NOTE      HPI:  Mr. Yen is 38 years old male with h/o Crohn's disease in remission for the past 5 years, Hidradenitis Suppurativa c/b scrotal edema and sacral ulcers, followed with dermatology, on Doxycycline, planned for Humera. He was referred to hematology clinic for worsening anemia and thrombocytosis.  Patient is in severe pain from his HS, can't sit still, he states feeling tired most of the time, and he might have lost few pounds over the last few weeks. He denies fevers or night sweats, no headache or blurry vision. No skin rash. Denies h/o blood clots. He states being told he had BIBIANA and was given oral iron but he's not taking them regularly.     3/12 clinically stable. - planned for IV iron today    Oncology History and Treatment synopsis  Oncology History    No history exists.       PMH:  Past Medical History:   Diagnosis Date    Allergic     Cataract     Inflammatory bowel disease    PMHx:  - Hidradenitis suppuortiva   - Crohn's disease -seen by gastroenterology in remission no evidence of inflammation repeat colonoscopy in 5 years  - Tobacco use-was precontemplative regarding quitting, 7 cigarettes/day x 20 years.   - Prediabetes- on metformin, resolved.   - Vitamin D deficiency-advise daily supplementation with 5000 units  - Iron deficiency anemia with thrombocytosis and leukocytosis and hgb 8.9 - has been taking iron supplement, sometimes forgets to take iron tablets. He is scheduled to see hematology in May.     PSH:  Past Surgical History:   Procedure Laterality Date    APPENDECTOMY  05/18/2016    Appendectomy       SH:  Social History     Tobacco Use    Smoking status: Every Day     Packs/day: 0.50     Years: 21.00     Additional pack years: 0.00     Total pack years: 10.50     Types: Cigarettes    Smokeless tobacco: Never   Substance Use Topics    Alcohol use: Yes     Alcohol/week: 2.0 standard drinks of alcohol     Types: 1 Glasses of wine, 1 Shots of liquor per week       reports current drug use. Drug: Marijuana.      CURRENT MEDS:  Current Outpatient Medications on File Prior to Visit   Medication Sig Dispense Refill    adalimumab (Humira) 40 mg/0.8 mL syringe kit prefilled syringe Inject under the skin.      adalimumab (Humira,CF, Pen Alnz-Yd-Pjkw HS) 80 mg/0.8 mL-40 mg/0.4 mL pen injector kit pen-injector starter kit Inject 160mg on day 1, then inject 80mg 2 weeks later. Loading dose. 3 each 0    adalimumab (Humira,CF, Pen) 40 mg/0.4 mL pen injector kit pen-injector Inject 1 Pen (40 mg) under the skin 1 (one) time per week. Maintenance dose. 4 each 11    chlorhexidine (Hibiclens) 4 % external liquid Apply topically once daily as needed for wound care. Use as body wash 300 mL 3    cyclobenzaprine (Flexeril) 5 mg tablet Take 1 tablet (5 mg) by mouth as needed at bedtime for muscle spasms. 30 tablet 0    doxycycline (Monodox) 100 mg capsule Take 1 capsule (100 mg) by mouth 2 times a day. 180 capsule 0    metFORMIN XR (Glucophage-XR) 500 mg 24 hr tablet Take 2 tablets (1,000 mg) by mouth once daily with breakfast. Do not crush, chew, or split. 180 tablet 1    varenicline (Chantix) 0.5 mg tablet Days 1 to 3: Take 1 tablet by mouth once daily Days 4 to 7: Take 1 tablet by mouth twice a day Day 8 and later: Take 2 tablets by mouth twice a day Take with full glass of water. 60 tablet 0     No current facility-administered medications on file prior to visit.       PHYSICAL EXAM:  There were no vitals taken for this visit.    Physical Exam  Constitutional:       Appearance: He is ill-appearing.   HENT:      Head: Normocephalic.      Nose: Nose normal.      Mouth/Throat:      Mouth: Mucous membranes are moist.   Eyes:      Pupils: Pupils are equal, round, and reactive to light.   Cardiovascular:      Rate and Rhythm: Normal rate and regular rhythm.      Pulses: Normal pulses.   Pulmonary:      Effort: Pulmonary effort is normal.   Abdominal:      General: Abdomen is flat.       "Palpations: Abdomen is soft. There is no mass.   Musculoskeletal:         General: Normal range of motion.      Cervical back: Normal range of motion and neck supple.   Skin:     General: Skin is warm.      Coloration: Skin is pale. Skin is not jaundiced.      Findings: No bruising, erythema or rash.   Neurological:      General: No focal deficit present.      Mental Status: He is alert.   Psychiatric:         Mood and Affect: Mood normal.             LAB DATA:    No components found for: \"CBC\"  Lab Results   Component Value Date    WBC 14.5 (H) 02/29/2024    WBC 16.1 (H) 01/31/2024    WBC 15.3 (H) 11/30/2023    WBC 16.5 (H) 10/31/2023    WBC CANCELED 05/12/2023     Lab Results   Component Value Date    HGB 8.7 (L) 02/29/2024    HGB 8.4 (L) 01/31/2024    HGB 8.9 (L) 11/30/2023    HGB 8.5 (L) 10/31/2023    HGB CANCELED 05/12/2023     Lab Results   Component Value Date     (H) 02/29/2024     (H) 01/31/2024     (H) 11/30/2023     (H) 10/31/2023    PLT CANCELED 05/12/2023       Lab Results   Component Value Date    GLUCOSE 91 02/29/2024    CALCIUM 9.0 02/29/2024     (L) 02/29/2024    K 4.4 02/29/2024    CO2 24 02/29/2024    CL 98 02/29/2024    BUN 7 02/29/2024    CREATININE 0.85 02/29/2024     Lab Results   Component Value Date    CREATININE 0.85 02/29/2024    CREATININE 0.74 11/30/2023    CREATININE CANCELED 05/12/2023    CREATININE 1.03 05/11/2023    CREATININE 1.04 05/10/2023       Lab Results   Component Value Date    ALT 19 02/29/2024    AST 17 02/29/2024    ALKPHOS 77 02/29/2024    BILITOT 0.3 02/29/2024       ASSESSMENT AND PLAN    Mr. Yen is 38 years old male with h/o Crohn's disease in remission for the past 5 years, Hidradenitis Suppurativa c/b scrotal edema and sacral ulcers, followed with dermatology, on Doxycycline, planned for Humera. He was referred to hematology clinic for worsening anemia and thrombocytosis.  His anemia is chronic dating back to at least 2019, " however it got worse to HGB 8-9 in May 2023, Anemia is microcytic with gradual decline in his MCV over the last year. Iron studies last tested in May 2023 with low iron but elevated Ferritin, likely in the setting of active inflammation.  His PLT and Neutrophils have been consistently elevated since that time as well, which coincide with his active HS and with elevated inflammatory markers.   The etiology of both is anemia and thrombocytosis is likely related to the active inflammation, and controlling the HS would help, there might as well be some iron deficiency that is complicating the picture, but hard to detect due to elevated Ferritin from inflammation. ET or MF although less likely in this situation (specially with no splenomegaly detected on exam) remain on the differential if he does not improve.     His Blood smear was reviewed today 2/29/24 and showed microcytic RBCs with few target cells, no nucleated RBCs noted, increased PLT with some large PLTs, increased neutrophils, no blasts or immature cells.    Recommendations:  - Will repeat Iron, Tsat, TIBC, Ferritin, Retic  - B12, Folate  - ESR, CRP  - SPEP/IF + FLC    - If there is evidence of BIBIANA, he might benefit from IV iron as inflammation can affect oral absorption, and would highly recommend his dermatology team to start treatment for HS to control the inflammation as that would ultimately help his blood counts as well.  - If his counts don't improve with iron and inflammation control then we can check JAK2/CALR/MPL mutations on peripheral blood     3/12/ 2024 Iron deficiency confirmed.  No other hematologic pathology.  Estimated iron deficit ~800 mg. Will replenish 200 mg x3  Starting IV iron supplementation today.    RTC 3 months      Duration of Visit  I spent 30 minutes in the care of this patient, including preparing, chart and results review, face time and charting.      Suma Almeida MD PhD

## 2024-03-13 DIAGNOSIS — L73.2 HIDRADENITIS SUPPURATIVA: ICD-10-CM

## 2024-03-13 RX ORDER — HEPARIN SODIUM,PORCINE/PF 10 UNIT/ML
50 SYRINGE (ML) INTRAVENOUS AS NEEDED
Status: CANCELLED | OUTPATIENT
Start: 2024-03-13

## 2024-03-13 RX ORDER — HEPARIN 100 UNIT/ML
500 SYRINGE INTRAVENOUS AS NEEDED
Status: CANCELLED | OUTPATIENT
Start: 2024-03-13

## 2024-03-13 RX ORDER — ADALIMUMAB 40MG/0.4ML
40 KIT SUBCUTANEOUS
Qty: 4 EACH | Refills: 11 | Status: SHIPPED | OUTPATIENT
Start: 2024-03-13

## 2024-03-13 RX ORDER — ADALIMUMAB 80MG/0.8ML
KIT SUBCUTANEOUS
Qty: 3 EACH | Refills: 0 | Status: SHIPPED | OUTPATIENT
Start: 2024-03-13

## 2024-03-13 NOTE — PROGRESS NOTES
Humira prior authorization was approved in December 2023. Dr. Khalil was contacted to send the prescriptions to [facility]. As of 3/13/24, the prescriptions have not been sent to [facility].     Clinical pharmacist routed Humira loading and maintenance prescriptions to [facility] per insurance mandate.

## 2024-03-15 ENCOUNTER — TELEPHONE (OUTPATIENT)
Dept: HEMATOLOGY/ONCOLOGY | Facility: HOSPITAL | Age: 39
End: 2024-03-15

## 2024-03-18 ENCOUNTER — INFUSION (OUTPATIENT)
Dept: HEMATOLOGY/ONCOLOGY | Facility: HOSPITAL | Age: 39
End: 2024-03-18
Payer: COMMERCIAL

## 2024-03-18 VITALS
RESPIRATION RATE: 18 BRPM | SYSTOLIC BLOOD PRESSURE: 142 MMHG | OXYGEN SATURATION: 96 % | BODY MASS INDEX: 28.54 KG/M2 | TEMPERATURE: 98.1 F | HEART RATE: 102 BPM | WEIGHT: 176.81 LBS | DIASTOLIC BLOOD PRESSURE: 70 MMHG

## 2024-03-18 DIAGNOSIS — D50.9 IRON DEFICIENCY ANEMIA, UNSPECIFIED IRON DEFICIENCY ANEMIA TYPE: ICD-10-CM

## 2024-03-18 PROCEDURE — 2500000004 HC RX 250 GENERAL PHARMACY W/ HCPCS (ALT 636 FOR OP/ED): Performed by: INTERNAL MEDICINE

## 2024-03-18 PROCEDURE — 96374 THER/PROPH/DIAG INJ IV PUSH: CPT | Mod: INF

## 2024-03-18 RX ORDER — HEPARIN SODIUM,PORCINE/PF 10 UNIT/ML
50 SYRINGE (ML) INTRAVENOUS AS NEEDED
OUTPATIENT
Start: 2024-03-18

## 2024-03-18 RX ORDER — FAMOTIDINE 10 MG/ML
20 INJECTION INTRAVENOUS ONCE AS NEEDED
OUTPATIENT
Start: 2024-03-21

## 2024-03-18 RX ORDER — HEPARIN 100 UNIT/ML
500 SYRINGE INTRAVENOUS AS NEEDED
Status: CANCELLED | OUTPATIENT
Start: 2024-03-18

## 2024-03-18 RX ORDER — HEPARIN SODIUM,PORCINE/PF 10 UNIT/ML
50 SYRINGE (ML) INTRAVENOUS AS NEEDED
Status: CANCELLED | OUTPATIENT
Start: 2024-03-18

## 2024-03-18 RX ORDER — EPINEPHRINE 0.3 MG/.3ML
0.3 INJECTION SUBCUTANEOUS EVERY 5 MIN PRN
OUTPATIENT
Start: 2024-03-21

## 2024-03-18 RX ORDER — DIPHENHYDRAMINE HYDROCHLORIDE 50 MG/ML
50 INJECTION INTRAMUSCULAR; INTRAVENOUS AS NEEDED
OUTPATIENT
Start: 2024-03-21

## 2024-03-18 RX ORDER — ALBUTEROL SULFATE 0.83 MG/ML
3 SOLUTION RESPIRATORY (INHALATION) AS NEEDED
OUTPATIENT
Start: 2024-03-21

## 2024-03-18 RX ORDER — HEPARIN 100 UNIT/ML
500 SYRINGE INTRAVENOUS AS NEEDED
OUTPATIENT
Start: 2024-03-18

## 2024-03-18 RX ADMIN — IRON SUCROSE 200 MG: 20 INJECTION, SOLUTION INTRAVENOUS at 14:25

## 2024-03-20 ENCOUNTER — TELEPHONE (OUTPATIENT)
Dept: HEMATOLOGY/ONCOLOGY | Facility: HOSPITAL | Age: 39
End: 2024-03-20

## 2024-03-21 ENCOUNTER — APPOINTMENT (OUTPATIENT)
Dept: HEMATOLOGY/ONCOLOGY | Facility: HOSPITAL | Age: 39
End: 2024-03-21
Payer: COMMERCIAL

## 2024-03-25 ENCOUNTER — APPOINTMENT (OUTPATIENT)
Dept: UROLOGY | Facility: CLINIC | Age: 39
End: 2024-03-25
Payer: COMMERCIAL

## 2024-05-16 ENCOUNTER — APPOINTMENT (OUTPATIENT)
Dept: HEMATOLOGY/ONCOLOGY | Facility: HOSPITAL | Age: 39
End: 2024-05-16
Payer: COMMERCIAL

## 2024-05-23 ENCOUNTER — APPOINTMENT (OUTPATIENT)
Dept: DERMATOLOGY | Facility: CLINIC | Age: 39
End: 2024-05-23
Payer: COMMERCIAL

## 2024-06-11 ENCOUNTER — APPOINTMENT (OUTPATIENT)
Dept: HEMATOLOGY/ONCOLOGY | Facility: HOSPITAL | Age: 39
End: 2024-06-11
Payer: COMMERCIAL

## 2024-06-18 ENCOUNTER — TELEPHONE (OUTPATIENT)
Dept: HEMATOLOGY/ONCOLOGY | Facility: HOSPITAL | Age: 39
End: 2024-06-18
Payer: COMMERCIAL

## 2024-06-18 NOTE — TELEPHONE ENCOUNTER
Pt did not show up to follow up visit with Dr. Allen. Called and left message with call back number to reschedule.

## 2024-08-20 ENCOUNTER — APPOINTMENT (OUTPATIENT)
Dept: PRIMARY CARE | Facility: CLINIC | Age: 39
End: 2024-08-20
Payer: COMMERCIAL

## 2024-08-20 VITALS
DIASTOLIC BLOOD PRESSURE: 68 MMHG | HEART RATE: 105 BPM | OXYGEN SATURATION: 98 % | WEIGHT: 170 LBS | BODY MASS INDEX: 27.44 KG/M2 | SYSTOLIC BLOOD PRESSURE: 113 MMHG

## 2024-08-20 DIAGNOSIS — L73.2 HIDRADENITIS SUPPURATIVA: ICD-10-CM

## 2024-08-20 DIAGNOSIS — F17.200 TOBACCO USE DISORDER: Primary | ICD-10-CM

## 2024-08-20 DIAGNOSIS — M62.830 BACK MUSCLE SPASM: ICD-10-CM

## 2024-08-20 PROCEDURE — 99214 OFFICE O/P EST MOD 30 MIN: CPT | Performed by: INTERNAL MEDICINE

## 2024-08-20 RX ORDER — CYCLOBENZAPRINE HCL 10 MG
10 TABLET ORAL NIGHTLY
Qty: 90 TABLET | Refills: 0 | Status: SHIPPED | OUTPATIENT
Start: 2024-08-20

## 2024-08-20 ASSESSMENT — PAIN SCALES - GENERAL: PAINLEVEL: 8

## 2024-08-20 NOTE — ASSESSMENT & PLAN NOTE
Chronic and longstanding, failed several topical preparations including clindamycin and benzyl peroxide.    No response to doxycycline  Has been recommended Humira although this has been held due to concerns about thrombocytosis and insurance issues.  Thrombocytosis is secondary to iron deficiency anemia as is most of his medical conditions.  Will reach out to dermatology to expedite follow-up and consideration to start a medication such as Humira  Refer to pharmacy team for assistance with coverage options  Continue Tylenol, not recommended NSAIDs due to history of Crohn's disease and bleeding risk, will uptitrate cyclobenzaprine.  Sedating effects reviewed.    Orders:    Follow Up In [facility] - Pharmacy; Future

## 2024-08-20 NOTE — ASSESSMENT & PLAN NOTE
Intolerant to Chantix subsequently discontinued  At present we will focus on that severe and debilitating pain related to hidradenitis prior to consideration for further interventions for smoking cessation.

## 2024-08-20 NOTE — PROGRESS NOTES
Subjective   Patient ID: J Carlos Yen is a 39 y.o. male who presents for Follow-up (2m fuv/).  HPI  39-year-old male here for follow-up visit, last seen in January.    PMHx:  -Hidradenitis suppuortiva with lymphedema-not recommended surgical management followed by dermatology placated by likely iron deficiency anemia-seen by dermatology not yet initiated Humira due to insurance coverage. Continues to have severe and debilitating pain with inability to sleep, unable to sit still due to severe pain, with continued bleeding and swelling. He continues to take tylenol 650mg around the clock. He was not recommended NSAIDs.   - Crohn's disease -seen by gastroenterology in remission no evidence of inflammation repeat colonoscopy in 5 years  - BIBIANA -from chronic blood loss seen by hematology recommended IV iron supplementation but was not covered by insurance and therefore he had to pay out of pocket, subsequently discontinued.   - Tobacco use-was precontemplative regarding quitting, 7 cigarettes/day x 20 years.  prescribed Chantix at last visit which made him feel nauseous despite low dose chantix, was not able to function had to discontinue.   -Prediabetes- on metformin, resolved.   -Vitamin D deficiency-advise daily supplementation with 5000 units       Social   - Tobacco use, cannot quantify 7 cigarettes/day  - Works at [employer]   - lives at home with wife   Current Outpatient Medications   Medication Instructions    adalimumab (Humira,CF, Pen Crohns-UC-HS) 80 mg/0.8 mL pen injector kit pen-injector Inject 160mg on day 0, then 80mg on day 15    chlorhexidine (Hibiclens) 4 % external liquid Topical, Daily PRN, Use as body wash    cyclobenzaprine (FLEXERIL) 10 mg, oral, Nightly    doxycycline (MONODOX) 100 mg, oral, 2 times daily    Humira(CF) Pen 40 mg, subcutaneous, Once Weekly, Maintenance dose.    metFORMIN XR (GLUCOPHAGE-XR) 1,000 mg, oral, Daily with breakfast, Do not crush, chew, or split.        Objective     BP  113/68   Pulse 105   Wt 77.1 kg (170 lb)   SpO2 98%   BMI 27.44 kg/m²     Physical Exam  General: Alert and oriented, in no apparent distress   HEENT: No conjunctival erythema, no external facial lesions   Lungs: Breathing comfortably  Skin: No evidence of skin breakdown.  Neuro: AAO x 3, answering questions appropriately, no obvious cranial nerve deficits     Assessment/Plan     Assessment & Plan  Back muscle spasm    Orders:    cyclobenzaprine (Flexeril) 10 mg tablet; Take 1 tablet (10 mg) by mouth once daily at bedtime.    Tobacco use disorder  Intolerant to Chantix subsequently discontinued  At present we will focus on that severe and debilitating pain related to hidradenitis prior to consideration for further interventions for smoking cessation.         Hidradenitis suppurativa  Chronic and longstanding, failed several topical preparations including clindamycin and benzyl peroxide.    No response to doxycycline  Has been recommended Humira although this has been held due to concerns about thrombocytosis and insurance issues.  Thrombocytosis is secondary to iron deficiency anemia as is most of his medical conditions.  Will reach out to dermatology to expedite follow-up and consideration to start a medication such as Humira  Refer to pharmacy team for assistance with coverage options  Continue Tylenol, not recommended NSAIDs due to history of Crohn's disease and bleeding risk, will uptitrate cyclobenzaprine.  Sedating effects reviewed.    Orders:    Follow Up In [facility] - Pharmacy; Future      Health Maintenance   Cancer screening:   - Colonoscopy  9/23 repeat 5 years

## 2024-08-28 DIAGNOSIS — M62.830 BACK MUSCLE SPASM: ICD-10-CM

## 2024-08-28 DIAGNOSIS — L73.2 HIDRADENITIS SUPPURATIVA: ICD-10-CM

## 2024-08-28 RX ORDER — DOXYCYCLINE 100 MG/1
100 CAPSULE ORAL 2 TIMES DAILY
Qty: 180 CAPSULE | Refills: 0 | Status: SHIPPED | OUTPATIENT
Start: 2024-08-28

## 2024-08-28 RX ORDER — CYCLOBENZAPRINE HCL 10 MG
10 TABLET ORAL NIGHTLY
Qty: 90 TABLET | Refills: 0 | Status: SHIPPED | OUTPATIENT
Start: 2024-08-28

## 2024-08-29 DIAGNOSIS — L73.2 HIDRADENITIS SUPPURATIVA: ICD-10-CM

## 2024-08-29 RX ORDER — ADALIMUMAB 80MG/0.8ML
KIT SUBCUTANEOUS
Qty: 3 EACH | Refills: 0 | Status: SHIPPED | OUTPATIENT
Start: 2024-08-29 | End: 2024-08-29 | Stop reason: SDUPTHER

## 2024-08-29 RX ORDER — ADALIMUMAB 40MG/0.4ML
40 KIT SUBCUTANEOUS
Qty: 4 EACH | Refills: 11 | Status: SHIPPED | OUTPATIENT
Start: 2024-09-01 | End: 2024-08-30 | Stop reason: SDUPTHER

## 2024-08-29 RX ORDER — ADALIMUMAB 80MG/0.8ML
KIT SUBCUTANEOUS
Qty: 3 EACH | Refills: 0 | Status: SHIPPED | OUTPATIENT
Start: 2024-08-29 | End: 2024-08-30 | Stop reason: SDUPTHER

## 2024-08-30 DIAGNOSIS — L73.2 HIDRADENITIS SUPPURATIVA: ICD-10-CM

## 2024-08-30 RX ORDER — ADALIMUMAB 40MG/0.4ML
40 KIT SUBCUTANEOUS
Qty: 4 EACH | Refills: 11 | Status: SHIPPED | OUTPATIENT
Start: 2024-09-01

## 2024-08-30 NOTE — PROGRESS NOTES
Dr. Khalil reached out about patient's Humira. Originally approved and prescriptions sent to [facility] in March 2024 for dispensing. Patient told primary care provider he wanted Humira in August 2024, so doesn't seem like patient ever started.     Dr. Khalil asked clinical pharmacist to resend Humira prescriptions. Sent to  Special[facility] for authorization. Per rejected claim for starter kit, refill too soon. Patient received via mail on 8/28/24.     Clinical pharmacist routed Humira maintenance prescription to [facility] per insurance mandate. No need to send starter kit as already filled. Baseline labs WNL.

## 2024-09-17 ENCOUNTER — APPOINTMENT (OUTPATIENT)
Dept: PHARMACY | Facility: HOSPITAL | Age: 39
End: 2024-09-17
Payer: COMMERCIAL

## 2024-10-10 ENCOUNTER — LAB (OUTPATIENT)
Dept: LAB | Facility: LAB | Age: 39
End: 2024-10-10
Payer: COMMERCIAL

## 2024-10-10 ENCOUNTER — APPOINTMENT (OUTPATIENT)
Dept: DERMATOLOGY | Facility: CLINIC | Age: 39
End: 2024-10-10
Payer: COMMERCIAL

## 2024-10-10 DIAGNOSIS — L73.2 HIDRADENITIS SUPPURATIVA: ICD-10-CM

## 2024-10-10 LAB
ALBUMIN SERPL BCP-MCNC: 3.5 G/DL (ref 3.4–5)
ALP SERPL-CCNC: 79 U/L (ref 33–120)
ALT SERPL W P-5'-P-CCNC: 7 U/L (ref 10–52)
ANION GAP SERPL CALC-SCNC: 13 MMOL/L (ref 10–20)
AST SERPL W P-5'-P-CCNC: 12 U/L (ref 9–39)
BASOPHILS # BLD AUTO: 0.06 X10*3/UL (ref 0–0.1)
BASOPHILS NFR BLD AUTO: 0.5 %
BILIRUB SERPL-MCNC: 0.4 MG/DL (ref 0–1.2)
BUN SERPL-MCNC: 5 MG/DL (ref 6–23)
CALCIUM SERPL-MCNC: 8.9 MG/DL (ref 8.6–10.6)
CHLORIDE SERPL-SCNC: 104 MMOL/L (ref 98–107)
CO2 SERPL-SCNC: 25 MMOL/L (ref 21–32)
CREAT SERPL-MCNC: 0.69 MG/DL (ref 0.5–1.3)
EGFRCR SERPLBLD CKD-EPI 2021: >90 ML/MIN/1.73M*2
EOSINOPHIL # BLD AUTO: 0.14 X10*3/UL (ref 0–0.7)
EOSINOPHIL NFR BLD AUTO: 1.1 %
ERYTHROCYTE [DISTWIDTH] IN BLOOD BY AUTOMATED COUNT: 19.1 % (ref 11.5–14.5)
GLUCOSE SERPL-MCNC: 101 MG/DL (ref 74–99)
HCT VFR BLD AUTO: 31.3 % (ref 41–52)
HGB BLD-MCNC: 9.4 G/DL (ref 13.5–17.5)
IMM GRANULOCYTES # BLD AUTO: 0.06 X10*3/UL (ref 0–0.7)
IMM GRANULOCYTES NFR BLD AUTO: 0.5 % (ref 0–0.9)
LYMPHOCYTES # BLD AUTO: 2.02 X10*3/UL (ref 1.2–4.8)
LYMPHOCYTES NFR BLD AUTO: 16.1 %
MCH RBC QN AUTO: 22.1 PG (ref 26–34)
MCHC RBC AUTO-ENTMCNC: 30 G/DL (ref 32–36)
MCV RBC AUTO: 74 FL (ref 80–100)
MONOCYTES # BLD AUTO: 0.85 X10*3/UL (ref 0.1–1)
MONOCYTES NFR BLD AUTO: 6.8 %
NEUTROPHILS # BLD AUTO: 9.4 X10*3/UL (ref 1.2–7.7)
NEUTROPHILS NFR BLD AUTO: 75 %
NRBC BLD-RTO: 0 /100 WBCS (ref 0–0)
PLATELET # BLD AUTO: 649 X10*3/UL (ref 150–450)
POTASSIUM SERPL-SCNC: 3.7 MMOL/L (ref 3.5–5.3)
PROT SERPL-MCNC: 8.1 G/DL (ref 6.4–8.2)
RBC # BLD AUTO: 4.25 X10*6/UL (ref 4.5–5.9)
SODIUM SERPL-SCNC: 138 MMOL/L (ref 136–145)
WBC # BLD AUTO: 12.5 X10*3/UL (ref 4.4–11.3)

## 2024-10-10 PROCEDURE — 99213 OFFICE O/P EST LOW 20 MIN: CPT | Performed by: DERMATOLOGY

## 2024-10-10 PROCEDURE — 85025 COMPLETE CBC W/AUTO DIFF WBC: CPT

## 2024-10-10 PROCEDURE — 80053 COMPREHEN METABOLIC PANEL: CPT

## 2024-10-10 RX ORDER — CHLORHEXIDINE GLUCONATE 40 MG/ML
SOLUTION TOPICAL DAILY PRN
Qty: 300 ML | Refills: 3 | Status: SHIPPED | OUTPATIENT
Start: 2024-10-10

## 2024-10-10 ASSESSMENT — DERMATOLOGY QUALITY OF LIFE (QOL) ASSESSMENT
RATE HOW BOTHERED YOU ARE BY EFFECTS OF YOUR SKIN PROBLEMS ON YOUR ACTIVITIES (EG, GOING OUT, ACCOMPLISHING WHAT YOU WANT, WORK ACTIVITIES OR YOUR RELATIONSHIPS WITH OTHERS): 4
WHAT SINGLE SKIN CONDITION LISTED BELOW IS THE PATIENT ANSWERING THE QUALITY-OF-LIFE ASSESSMENT QUESTIONS ABOUT: NONE OF THE ABOVE
RATE HOW EMOTIONALLY BOTHERED YOU ARE BY YOUR SKIN PROBLEM (FOR EXAMPLE, WORRY, EMBARRASSMENT, FRUSTRATION): 3
RATE HOW BOTHERED YOU ARE BY SYMPTOMS OF YOUR SKIN PROBLEM (EG, ITCHING, STINGING BURNING, HURTING OR SKIN IRRITATION): 6 - ALWAYS BOTHERED
RATE HOW BOTHERED YOU ARE BY SYMPTOMS OF YOUR SKIN PROBLEM (EG, ITCHING, STINGING BURNING, HURTING OR SKIN IRRITATION): 6 - ALWAYS BOTHERED
RATE HOW BOTHERED YOU ARE BY EFFECTS OF YOUR SKIN PROBLEMS ON YOUR ACTIVITIES (EG, GOING OUT, ACCOMPLISHING WHAT YOU WANT, WORK ACTIVITIES OR YOUR RELATIONSHIPS WITH OTHERS): 4
WHAT SINGLE SKIN CONDITION LISTED BELOW IS THE PATIENT ANSWERING THE QUALITY-OF-LIFE ASSESSMENT QUESTIONS ABOUT: NONE OF THE ABOVE
RATE HOW EMOTIONALLY BOTHERED YOU ARE BY YOUR SKIN PROBLEM (FOR EXAMPLE, WORRY, EMBARRASSMENT, FRUSTRATION): 3

## 2024-10-10 NOTE — PROGRESS NOTES
Subjective     J Carlos Yen is a 39 y.o. male who presents for the following: Hidradenitis Suppurativa (HS to groin and bilateral buttocks. / Pt states hasn't had any recent flare ups. Pt states significant improvement in comfort and able to move freely but still there are itching and drainage. ). Started Humira a month ago. No injection site reactions. He injects himself in his thigh. He can deal with it now. He is moving a little better.  Denies fever or cough.      Review of Systems:  No other skin or systemic complaints other than what is documented elsewhere in the note.    The following portions of the chart were reviewed this encounter and updated as appropriate:          Skin Cancer History  No skin cancer on file.      Specialty Problems          Dermatology Problems    Groin rash    Hidradenitis suppurativa        Objective   Well appearing patient in no apparent distress; mood and affect are within normal limits.    A focused skin examination was performed. All findings within normal limits unless otherwise noted below.    Assessment/Plan   1. Hidradenitis suppurativa    Related Procedures  Follow Up In Dermatology - Established Patient    Related Medications  chlorhexidine (Hibiclens) 4 % external liquid  Apply topically once daily as needed for wound care. Use as body wash    doxycycline (Monodox) 100 mg capsule  Take 1 capsule (100 mg) by mouth 2 times a day.    adalimumab (Humira,CF, Pen) 40 mg/0.4 mL pen injector kit pen-injector  Inject 1 Pen (40 mg) under the skin 1 (one) time per week. Maintenance dose.

## 2024-10-12 LAB
NIL(NEG) CONTROL SPOT COUNT: NORMAL
PANEL A SPOT COUNT: 3
PANEL B SPOT COUNT: 0
POS CONTROL SPOT COUNT: NORMAL
T-SPOT. TB INTERPRETATION: NEGATIVE

## 2024-12-05 NOTE — PROGRESS NOTES
Subjective   Patient ID: J Carlos Yen is a 38 y.o. male who presents for Annual Exam.  HPI  38-year-old male here for follow-up visit after last seen for establishment of care in March, subsequently recommended ED presentation in May.  He was hospitalized from May 7 to 11 for sacral and buttock abscess, a CT showed a large multiloculated abscess in the posterior pelvis treated with IV Vanco and Zosyn, wound cultures grew mixed bacteria blood cultures were negative with leukocytosis.  Area was incised and drained by Dr. Cruz on 5/8 subsequently discharged with a prescription for Augmentin.  He was seen by urology in July referred to reconstructive urology recommended consideration for reinstitution of Humira prior to reconstructive surgery and referred to dermatology    3/23: severe vit D def send rx, iron deficiency anemia with elevated inflammatory markers, prediabetes    Complaining of intermittent back spasms after sustained a significant injury. Denies incontinence, weakness, paresthesia, or saddle anesthesia.     PMHx:  -Hidradenitis suppuortiva- gets symptoms in the axillary region but also notes swelling and sores in scrotum and buttocks, at times bursts and bleeds, a/w severe pruritus, last seen by physician in many years. He was receiving humira every so often but notes some remission but never remission. He has been seen by derm for this and then lost to followup. Surgery was proposed but he was hesitant in the past. He was also seen by urology, and gastroenterology.  - Crohn's disease - treated intermittently with Humira seen by Tatyana Mejias in May recommended found to have remission of crohn's disease recommended no need for treatment and followup colonoscopy in 5 years, given doxycycline   - Tobacco use     Social   - Tobacco use, cannot quantify   - Works at [employer]   - lives at home with wife     Current Outpatient Medications   Medication Instructions    adalimumab (Humira) 40 mg/0.8 mL syringe  "kit prefilled syringe subcutaneous    amoxicillin-pot clavulanate (Augmentin) 875-125 mg tablet 875 mg, oral    cyclobenzaprine (FLEXERIL) 5 mg, oral, Nightly PRN    doxycycline (MONODOX) 100 mg, oral, 2 times daily    metFORMIN XR (GLUCOPHAGE-XR) 1,000 mg, oral, Daily with breakfast, Do not crush, chew, or split.        Objective     /75   Pulse 105   Temp 37.3 °C (99.2 °F) (Oral)   Ht 1.676 m (5' 6\")   Wt 81.4 kg (179 lb 6.4 oz)   SpO2 98%   BMI 28.96 kg/m²     Physical Exam  General: Alert and oriented, in no apparent distress   HEENT: No conjunctival erythema, no external facial lesions   Lungs: Breathing comfortably  Skin: Visualization of sacral area demonstrates one oozing HS lesion at gluteal cleft, no additioanl inspection performed at today's visit.  Neuro: AAO x 3, answering questions appropriately, no obvious cranial nerve deficits  Back: No midline spinal tenderness, no paraspinal tenderness. Strength 5/5 bilateral lower extremities. Gross sensation intact bilaterally.Able to ambulate without difficulty.       Assessment/Plan   Problem List Items Addressed This Visit       Crohn's disease (CMS/HCC)  Reportedly in remission, no evidence of inflammation found. No recommendations for treatment. Repeat colonoscopy in 5 years.     Hidradenitis suppurativa  Severe, not recommended surgical management of the scrotal area, awaiting appointment with dermatology for which we will attempt to expedite. Continue doxycycline for now. In consideration for Humira, as has been previously responsive to this.     Relevant Medications    doxycycline (Monodox) 100 mg capsule    Prediabetes     Extensively discussed, interested in nutrition referral.   Will start metformin, potential side effects reviewed. There are potential antiandrogen effects with benefits for HS patients as well.          Relevant Medications    metFORMIN XR (Glucophage-XR) 500 mg 24 hr tablet    Other Relevant Orders    Hemoglobin A1C " (Completed)     Other Visit Diagnoses       Iron deficiency anemia due to chronic blood loss    -  Primary  Persists and severe, ensure stability.     Encounter for preventative adult health care examination        Relevant Orders    CBC and Auto Differential    Follow Up In [facility] - PCP - Health Maintenance    Back muscle spasm      No alarm features, cannot take NSAIDs. Will rx brief course of cyclobenzaprine, potential side effects reviewed. Will try at night first.     Relevant Medications    cyclobenzaprine (Flexeril) 5 mg tablet     Vitamin D deficiency   Stopped high dose vitamin D, severe. Advised daily supplementation with 5000 units of Vitamin D3 daily.     Tobacco Use   Precontamplative regarding quitting, will address at next visit. Brief cessation counseling provided.     Health Maintenance   Immunizations: flu shot declined, Tdap UTD, Hep b immune   Followup 3 months for preventive care visit.    no

## 2025-01-30 ENCOUNTER — APPOINTMENT (OUTPATIENT)
Dept: DERMATOLOGY | Facility: CLINIC | Age: 40
End: 2025-01-30
Payer: COMMERCIAL

## 2025-01-30 DIAGNOSIS — L73.2 HIDRADENITIS SUPPURATIVA: ICD-10-CM

## 2025-01-30 PROCEDURE — 99213 OFFICE O/P EST LOW 20 MIN: CPT | Performed by: DERMATOLOGY

## 2025-01-30 NOTE — PROGRESS NOTES
Subjective     J Carlos Yen is a 39 y.o. male who presents for the following: Hidradenitis Suppurativa (Follow up Pt takes hibiclens as body wash; doxycycline 100 cap TID and humira(biosimilar) once a week. Pt states he's more mobile and has no flare up. /Gluteal and tail bone still have lesions that are purulent pt states ongoing. ).     Review of Systems:  No other skin or systemic complaints other than what is documented elsewhere in the note.    The following portions of the chart were reviewed this encounter and updated as appropriate:          Skin Cancer History  No skin cancer on file.      Specialty Problems          Dermatology Problems    Groin rash    Hidradenitis suppurativa        Objective   Well appearing patient in no apparent distress; mood and affect are within normal limits.    A focused skin examination was performed. All findings within normal limits unless otherwise noted below.    Assessment/Plan   1. Hidradenitis suppurativa    Related Procedures  Follow Up In Dermatology - Established Patient    Related Medications  doxycycline (Monodox) 100 mg capsule  Take 1 capsule (100 mg) by mouth 2 times a day.    adalimumab (Humira,CF, Pen) 40 mg/0.4 mL pen injector kit pen-injector  Inject 1 Pen (40 mg) under the skin 1 (one) time per week. Maintenance dose.    chlorhexidine (Hibiclens) 4 % external liquid  Apply topically once daily as needed for wound care. Use as body wash

## 2025-02-05 DIAGNOSIS — R73.03 PREDIABETES: ICD-10-CM

## 2025-02-05 RX ORDER — METFORMIN HYDROCHLORIDE 500 MG/1
TABLET, EXTENDED RELEASE ORAL
Qty: 180 TABLET | Refills: 0 | Status: SHIPPED | OUTPATIENT
Start: 2025-02-05

## 2025-03-18 ENCOUNTER — APPOINTMENT (OUTPATIENT)
Dept: PRIMARY CARE | Facility: CLINIC | Age: 40
End: 2025-03-18
Payer: COMMERCIAL

## 2025-05-01 ENCOUNTER — APPOINTMENT (OUTPATIENT)
Dept: DERMATOLOGY | Facility: CLINIC | Age: 40
End: 2025-05-01
Payer: COMMERCIAL

## 2025-06-02 DIAGNOSIS — L73.2 HIDRADENITIS SUPPURATIVA: ICD-10-CM

## 2025-06-02 DIAGNOSIS — M62.830 BACK MUSCLE SPASM: ICD-10-CM

## 2025-06-03 RX ORDER — CYCLOBENZAPRINE HCL 10 MG
10 TABLET ORAL NIGHTLY
Qty: 14 TABLET | Refills: 0 | Status: SHIPPED | OUTPATIENT
Start: 2025-06-03

## 2025-06-03 RX ORDER — DOXYCYCLINE 100 MG/1
100 CAPSULE ORAL 2 TIMES DAILY
Qty: 60 CAPSULE | Refills: 0 | Status: SHIPPED | OUTPATIENT
Start: 2025-06-03

## 2025-06-26 ENCOUNTER — APPOINTMENT (OUTPATIENT)
Dept: PRIMARY CARE | Facility: CLINIC | Age: 40
End: 2025-06-26
Payer: COMMERCIAL

## 2025-06-26 VITALS
DIASTOLIC BLOOD PRESSURE: 62 MMHG | HEART RATE: 95 BPM | SYSTOLIC BLOOD PRESSURE: 119 MMHG | OXYGEN SATURATION: 99 % | BODY MASS INDEX: 25.66 KG/M2 | WEIGHT: 159 LBS

## 2025-06-26 DIAGNOSIS — R73.03 PREDIABETES: ICD-10-CM

## 2025-06-26 DIAGNOSIS — D50.0 IRON DEFICIENCY ANEMIA DUE TO CHRONIC BLOOD LOSS: ICD-10-CM

## 2025-06-26 DIAGNOSIS — Z23 NEED FOR PNEUMOCOCCAL VACCINE: ICD-10-CM

## 2025-06-26 DIAGNOSIS — Z00.00 ENCOUNTER FOR PREVENTATIVE ADULT HEALTH CARE EXAMINATION: Primary | ICD-10-CM

## 2025-06-26 DIAGNOSIS — R01.1 MURMUR: ICD-10-CM

## 2025-06-26 DIAGNOSIS — K50.919 CROHN'S DISEASE WITH COMPLICATION, UNSPECIFIED GASTROINTESTINAL TRACT LOCATION: ICD-10-CM

## 2025-06-26 DIAGNOSIS — E55.9 VITAMIN D DEFICIENCY: ICD-10-CM

## 2025-06-26 DIAGNOSIS — Z23 NEED FOR DIPHTHERIA-TETANUS-PERTUSSIS (TDAP) VACCINE: ICD-10-CM

## 2025-06-26 DIAGNOSIS — Z12.5 PROSTATE CANCER SCREENING: ICD-10-CM

## 2025-06-26 DIAGNOSIS — R61 NIGHT SWEATS: ICD-10-CM

## 2025-06-26 DIAGNOSIS — F17.200 TOBACCO USE DISORDER: ICD-10-CM

## 2025-06-26 DIAGNOSIS — L73.2 HIDRADENITIS SUPPURATIVA: ICD-10-CM

## 2025-06-26 PROBLEM — R21 GROIN RASH: Status: RESOLVED | Noted: 2023-01-27 | Resolved: 2025-06-26

## 2025-06-26 PROBLEM — N48.9 PENILE LESION: Status: RESOLVED | Noted: 2023-01-27 | Resolved: 2025-06-26

## 2025-06-26 PROCEDURE — 90472 IMMUNIZATION ADMIN EACH ADD: CPT | Performed by: INTERNAL MEDICINE

## 2025-06-26 PROCEDURE — 90715 TDAP VACCINE 7 YRS/> IM: CPT | Performed by: INTERNAL MEDICINE

## 2025-06-26 PROCEDURE — 99406 BEHAV CHNG SMOKING 3-10 MIN: CPT | Performed by: INTERNAL MEDICINE

## 2025-06-26 PROCEDURE — 90677 PCV20 VACCINE IM: CPT | Performed by: INTERNAL MEDICINE

## 2025-06-26 PROCEDURE — 4004F PT TOBACCO SCREEN RCVD TLK: CPT | Performed by: INTERNAL MEDICINE

## 2025-06-26 PROCEDURE — 99396 PREV VISIT EST AGE 40-64: CPT | Performed by: INTERNAL MEDICINE

## 2025-06-26 PROCEDURE — 90471 IMMUNIZATION ADMIN: CPT | Performed by: INTERNAL MEDICINE

## 2025-06-26 PROCEDURE — 99214 OFFICE O/P EST MOD 30 MIN: CPT | Performed by: INTERNAL MEDICINE

## 2025-06-26 RX ORDER — FERROUS SULFATE 325(65) MG
325 TABLET ORAL
COMMUNITY

## 2025-06-26 ASSESSMENT — PAIN SCALES - GENERAL: PAINLEVEL_OUTOF10: 0-NO PAIN

## 2025-06-26 NOTE — PATIENT INSTRUCTIONS
"J Carlos,   Smoking - try reading \"Atomic Habits\" by Gaurav Dsouza   Schedule dentist   Labs including bloodwork and/or urine is ordered today. The lab can be found on this floor (2nd floor) next to the pharmacy across from the elevators.    Crohn's - Gastroenterology referral - I recommend Dr. Armin Obrien, Dr. Palomo Gutierrez, Dr. Evelio Holcomb,   I have ordered an echocardiogram to better evaluate your heart. Please stop by the cardiology department on the third floor or call [phone] to schedule this study.   Refer to wound clinic   Followup 3 months   "

## 2025-06-26 NOTE — PROGRESS NOTES
Subjective   Patient ID: J Carlos Yen is a 40 y.o. male who presents for Annual Exam.  History of Present Illness  40-year-old male here for preventive care visit, last seen August    He has experienced significant improvement in mobility and no longer requires a cane or handicap aids. He self-injects adalimumab and takes metformin, with reduced need for Flexeril. A minor back pain flare-up occurred but is not bothersome.    For Crohn's disease, he had a brief episode of gastrointestinal discomfort with altered bowel movements, which resolved quickly. He reports weight loss, night sweats, and occasional black stools with stomach upset. There is no chest pain, palpitations, shortness of breath, cough, or blood in stool.    He has open wounds post-surgery, concerned about closure and fluid accumulation. The wounds remain open at the top, and he has a mental block about them closing.    He quit smoking cigarettes a week ago and currently smokes cigars. He is aware of the financial cost and describes his smoking as unconscious. He occasionally consumes alcohol and smokes marijuana. He sleeps four to five hours on weekdays and does not engage in regular exercise.    PMHx:  -Hidradenitis suppuortiva with lymphedema-not recommended surgical management followed by dermatology c/b by likely iron deficiency anemia-seen by dermatologyon Humira greatly greatly improved, able to ambulate   - Crohn's disease -seen by gastroenterology in remission no evidence of inflammation repeat colonoscopy in 5 years last 9/23  - BIBIANA -from chronic blood loss seen by hematology recommended IV iron supplementation but was not covered by insurance and therefore he had to pay out of pocket, subsequently discontinued.   - Tobacco use-was precontemplative regarding quitting, 7 cigarettes/day x 20 years. (Chantix - nausea)   - Prediabetes- on metformin, resolved.   - Vitamin D deficiency-advise daily supplementation with 5000 units  - Back pain on  flexeril prn intermittent flare ups.      Social   - Tobacco use, cannot quantify 7 cigarettes/day  - Works at [employer]   - Lives with wife, 3 children 16, and 11 year old twins - boys   - 1/4 PPD x 20 years, occasional alcohol     Lifestyle   - Diet- noodles, rice, mainly home cooked, varied. A lot of processed foods.   - Exercise - none   - Sleep - ok, weekdays 4-5 hours weekend gets better.       PMHx, FHx, Social Hx, Surg Hx personally reviewed at this appointment. No pertinent findings and/or changes from prior (if applicable).      Objective     /62   Pulse 95   Wt 72.1 kg (159 lb)   SpO2 99%   BMI 25.66 kg/m²    General: Awake, alert, appears stated age   Head/eyes/ears: NCAT, EOMI, PERRL, TM WNL, no cerumen  Throat: mucus membranes moist, pharynx unremarkable   Neck: Supple, nontender, no lymphadenopathy, thyroid exam unremarkable   Heart: RRR, no murmurs, rubs or gallops  Lungs: No wheezes, rhonchi or whales,  Abdomen: Soft, NT/ND  Extremities: No edema, 2+ DP pulses   Testicular exam: edematous scrotum and penis without e/o overlying skin erosion  Skin: draining HS at gluteal cleft, significantly improved from prior   Neuro: AAO x 3, no FND, gait hesitant       Lab Results   Component Value Date    WBC 12.5 (H) 10/10/2024    HGB 9.4 (L) 10/10/2024    HCT 31.3 (L) 10/10/2024     (H) 10/10/2024    CHOL 105 03/07/2023    TRIG 56 03/07/2023    HDL 33.0 (A) 03/07/2023    ALT 7 (L) 10/10/2024    AST 12 10/10/2024     10/10/2024    K 3.7 10/10/2024     10/10/2024    CREATININE 0.69 10/10/2024    BUN 5 (L) 10/10/2024    CO2 25 10/10/2024    TSH 1.40 03/07/2023    INR 1.2 (H) 02/29/2024    HGBA1C 5.4 10/31/2023         Current Outpatient Medications   Medication Instructions    chlorhexidine (Hibiclens) 4 % external liquid Topical, Daily PRN, Use as body wash    cyclobenzaprine (FLEXERIL) 10 mg, oral, Nightly    doxycycline (MONODOX) 100 mg, oral, 2 times daily    ferrous sulfate 325  mg, Daily with breakfast    Humira(CF) Pen 40 mg, subcutaneous, Once Weekly, Maintenance dose.    metFORMIN  mg 24 hr tablet TAKE 2 TABLETS BY MOUTH EVERY MORNING WITH BREAKFAST- DO NOT CRUSH CHEW OR SPLIT        Colonoscopy  Patient Name: J Carlos Yen  Procedure Date: 9/15/2023 2:49 PM  MRN: [id number]  Account Number: [id number]  YOB: 1985  Site: [facility] Procedure Room 3  Ethnicity: Not  or   Race: Black or   Attending MD: Shailesh Obrien MD, [id number]  Procedure:             Colonoscopy  Indications:           Last colonoscopy: July 2016, Disease activity                          assessment of Crohn's disease of the small bowel and                          colon  Providers:             Shailesh Obrien MD (Doctor) Gastroenterology  Referring MD:          Tatyana Mejias CNP  Medicines:             Midazolam 5 mg IV, Meperidine 100 mg IV  Complications:         No immediate complications.  Procedure:             Pre-Anesthesia Assessment:                         - Prior to the procedure, a History and Physical was                          performed, and patient medications and allergies were                          reviewed. The patient is competent. The risks and                          benefits of the procedure and the sedation options and                          risks were discussed with the patient. All questions                          were answered and informed consent was obtained.                          Patient identification and proposed procedure were                          verified by the physician and the nurse in the                          pre-procedure area. Mental Status Examination: alert                          and oriented. Airway Examination: Mallampati Class III                          (part of the uvula and soft palate visualized).                          Respiratory Examination: clear to auscultation. CV                           Examination: normal. Prophylactic Antibiotics: The                          patient does not require prophylactic antibiotics.                          Prior Anticoagulants: The patient has taken no                          anticoagulant or antiplatelet agents. ASA Grade                          Assessment: II - A patient with mild systemic disease.                          After reviewing the risks and benefits, the patient                          was deemed in satisfactory condition to undergo the                          procedure. The anesthesia plan was to use moderate                          sedation / analgesia (conscious sedation). Immediately                          prior to administration of medications, the patient                          was re-assessed for adequacy to receive sedatives. The                          heart rate, respiratory rate, oxygen saturations,                          blood pressure, adequacy of pulmonary ventilation, and                          response to care were monitored throughout the                          procedure. The physical status of the patient was                          re-assessed after the procedure.                         After I obtained informed consent, the scope was                          passed under direct vision. Throughout the procedure,                          the patient's blood pressure, pulse, and oxygen                          saturations were monitored continuously. The adult                          colonoscope was introduced through the anus and                          advanced to the terminal ileum. The colonoscopy was                          performed without difficulty. The patient tolerated                          the procedure well. The quality of the bowel                          preparation was evaluated using the BBPS ([place] Bowel                          Preparation Scale) with scores of: Right Colon = 3                           (entire mucosa seen well with no residual staining,                          small fragments of stool or opaque liquid), Transverse                          Colon = 3 (entire mucosa seen well with no residual                          staining, small fragments of stool or opaque liquid)                          and Left Colon = 3 (entire mucosa seen well with no                          residual staining, small fragments of stool or opaque                          liquid). The total BBPS score equals 9. The quality of                          the bowel preparation was excellent. The terminal                          ileum, ileocecal valve, appendiceal orifice, and                          rectum were photographed.  Findings:       The perianal exam findings include edema of the gluteal clefts with        multiple areas on the left where pressure led several small areas of        purulent drainage from active hydradenitis suppurativa. The induration        extended to the peineum and lateral marginxs of the scrotum with        moderate amount of purulent drainage without obvious source extending        anteriorly towards the groin..       The digital rectal exam was normal.       The apryl-terminal ileum appeared normal.       There was evidence of a prior end-to-end ileo-colonic anastomosis in the        ascending colon. This was patent and was characterized by healthy        appearing mucosa with a single superficial erosion similar to and not        progressed from 2016. The anastomosis was easily traversed.       The entire examined colon appeared normal on direct and retroflexion        views.  Moderate Sedation:       Moderate (conscious) sedation was administered by the nurse and        supervised by the endoscopist. The following parameters were monitored:        oxygen saturation, heart rate, blood pressure, and response to care.        Total physician intraservice time was 39 minutes.  Estimated Blood  Loss:       Estimated blood loss: none.  Impression:            - Edema of the gluteal clefts with multiple areas of                          active hydradenitis suppurativa with purulent drainage                          was found on perianal exam.                         - The examined portion of the ileum was normal.                         - Patent end-to-end ileo-colonic anastomosis,                          characterized by healthy appearing mucosa and erosion.                         - The entire examined colon is normal on direct and                          retroflexion views.                         - No specimens collected.  Recommendation:        - The patient will be observed post-procedure, until                          all discharge criteria are met.                         - Patient has a contact number available for                          emergencies. The signs and symptoms of potential                          delayed complications were discussed with the patient.                          Return to normal activities tomorrow. Written                          discharge instructions were provided to the patient.                         - Resume previous diet.                         - Continue present medications.                         - Post procedure medication orders were given:                          doxycycline 100 mg BID x 30 days                         - Repeat colonoscopy in 5 years for surveillance.                         - Return to nurse practitioner PRN.                         - Recommend follow-up with dermatology as ordered for                          management of hydradenitis suppurativa.  Attending Participation:       I was present and participated during the entire procedure, including        non-holm portions.  Shailesh Obrien MD  9/15/2023 3:39:42 PM  This report has been signed electronically.  Number of Addenda: 0  Note Initiated On: 9/15/2023 2:49 PM  Scope  Withdrawal Time 0 hours 10 minutes 25 seconds   Total Procedure Duration Time 0 hours 21 minutes 4 seconds            Assessment & Plan  Adult Health Examination  Age appropriate screening performed   Depression screen negative   No additional pertinent family history or toxic habits   No high risk sexual behavior declines STI screening  Cancer screen   - Colonoscopy 9/23 no specimens collected, hidradenitis on perianal exam, patent end-to-end ileocolonic anastomosis with healthy-appearing mucosa and erosion repeat 5 years  - Skin followed by Derm  Immunizations:   - Due: Prevnar and Tetanus   - UTD: Hep B   Dental due and visual exams due as well.   Discussed adequate vitamin D intake.      Hidradenitis Suppurativa with Lymphedema  Open wounds persist in the groin with swelling. Discussed wound closure benefits and inflammation's role in fluid production.  - Encourage regular soaks.  - Refer to wound care for assessment and potential lymphatic drainage.  - Continue Humira.  - Monitor for infection signs and ensure proper wound care.    Tobacco Use Disorder  Reduced smoking to a quarter pack per day. Discussed benefits of quitting and suggested 'Atomic Habits' for habit change. Emphasized improvement in hidradenitis suppurativa with cessation.  - Encourage reading 'Atomic Habits' by Gaurav Dsouza.  - Set goal to quit smoking by next visit in three months.  - Discuss benefits of quitting on hidradenitis suppurativa  3-10 minutes spent on tobacco cessation counseling in today's visit.    Crohn's Disease  Minor gastrointestinal discomfort resolved. Concern about weight loss and night sweats. Further evaluation needed to rule out active disease or complications.  - Refer to gastroenterology.  - Order full blood work, stool studies   - Consider repeating colonoscopy if indicated.    Murmur   - TTE     Iron Deficiency Anemia  Currently taking iron supplements.  - Recheck iron levels with blood work.    Vitamin D  Deficiency  Not taking supplements. Critically low levels previously noted. Discussed importance for bone health.  - Recheck vitamin D levels with blood work.  - Encourage ten minutes of sunlight exposure daily.    General Health Maintenance  Due for vaccinations and has not seen a dentist or ophthalmologist recently. Emphasized importance of vaccinations and regular health maintenance.  - Administer tetanus and pneumonia vaccines.  - Encourage scheduling a dental appointment.  - Discuss importance of regular eye exams.    If above workup unrevealing, suggest CT c/a/p for further evaluation             Yaima Lanier, DO       This medical note was created with the assistance of artificial intelligence (AI) for documentation purposes. The content has been reviewed and confirmed by the healthcare provider for accuracy and completeness. Patient consented to the use of audio recording and use of AI during their visit.

## 2025-06-27 LAB
25(OH)D3+25(OH)D2 SERPL-MCNC: 12 NG/ML (ref 30–100)
ALBUMIN SERPL-MCNC: 3.2 G/DL (ref 3.6–5.1)
ALP SERPL-CCNC: 95 U/L (ref 36–130)
ALT SERPL-CCNC: 9 U/L (ref 9–46)
ANION GAP SERPL CALCULATED.4IONS-SCNC: 7 MMOL/L (CALC) (ref 7–17)
AST SERPL-CCNC: 15 U/L (ref 10–40)
BASOPHILS # BLD AUTO: 72 CELLS/UL (ref 0–200)
BASOPHILS NFR BLD AUTO: 0.5 %
BILIRUB SERPL-MCNC: 0.4 MG/DL (ref 0.2–1.2)
BUN SERPL-MCNC: 5 MG/DL (ref 7–25)
CALCIUM SERPL-MCNC: 8.8 MG/DL (ref 8.6–10.3)
CHLORIDE SERPL-SCNC: 104 MMOL/L (ref 98–110)
CHOLEST SERPL-MCNC: 107 MG/DL
CHOLEST/HDLC SERPL: 2.6 (CALC)
CO2 SERPL-SCNC: 25 MMOL/L (ref 20–32)
CREAT SERPL-MCNC: 0.65 MG/DL (ref 0.6–1.29)
CRP SERPL-MCNC: 54.9 MG/L
EGFRCR SERPLBLD CKD-EPI 2021: 122 ML/MIN/1.73M2
EOSINOPHIL # BLD AUTO: 172 CELLS/UL (ref 15–500)
EOSINOPHIL NFR BLD AUTO: 1.2 %
ERYTHROCYTE [DISTWIDTH] IN BLOOD BY AUTOMATED COUNT: 20.3 % (ref 11–15)
ERYTHROCYTE [SEDIMENTATION RATE] IN BLOOD BY WESTERGREN METHOD: 109 MM/H
EST. AVERAGE GLUCOSE BLD GHB EST-MCNC: 108 MG/DL
EST. AVERAGE GLUCOSE BLD GHB EST-SCNC: 6 MMOL/L
FERRITIN SERPL-MCNC: 248 NG/ML (ref 38–380)
GLUCOSE SERPL-MCNC: 74 MG/DL (ref 65–139)
HBA1C MFR BLD: 5.4 %
HCT VFR BLD AUTO: 31.6 % (ref 38.5–50)
HDLC SERPL-MCNC: 41 MG/DL
HGB BLD-MCNC: 8.9 G/DL (ref 13.2–17.1)
IRON SATN MFR SERPL: 5 % (CALC) (ref 20–48)
IRON SERPL-MCNC: 12 MCG/DL (ref 50–180)
LDLC SERPL CALC-MCNC: 52 MG/DL (CALC)
LYMPHOCYTES # BLD AUTO: 2989 CELLS/UL (ref 850–3900)
LYMPHOCYTES NFR BLD AUTO: 20.9 %
MCH RBC QN AUTO: 21.8 PG (ref 27–33)
MCHC RBC AUTO-ENTMCNC: 28.2 G/DL (ref 32–36)
MCV RBC AUTO: 77.3 FL (ref 80–100)
MONOCYTES # BLD AUTO: 944 CELLS/UL (ref 200–950)
MONOCYTES NFR BLD AUTO: 6.6 %
NEUTROPHILS # BLD AUTO: ABNORMAL CELLS/UL (ref 1500–7800)
NEUTROPHILS NFR BLD AUTO: 70.8 %
NONHDLC SERPL-MCNC: 66 MG/DL (CALC)
PLATELET # BLD AUTO: 640 THOUSAND/UL (ref 140–400)
PMV BLD REES-ECKER: 9.2 FL (ref 7.5–12.5)
POTASSIUM SERPL-SCNC: 4.1 MMOL/L (ref 3.5–5.3)
PROT SERPL-MCNC: 8 G/DL (ref 6.1–8.1)
PSA SERPL-MCNC: 0.33 NG/ML
RBC # BLD AUTO: 4.09 MILLION/UL (ref 4.2–5.8)
SODIUM SERPL-SCNC: 136 MMOL/L (ref 135–146)
T4 FREE SERPL-MCNC: 1 NG/DL (ref 0.8–1.8)
TIBC SERPL-MCNC: 229 MCG/DL (CALC) (ref 250–425)
TRIGL SERPL-MCNC: 61 MG/DL
TSH SERPL-ACNC: 0.38 MIU/L (ref 0.4–4.5)
VIT B12 SERPL-MCNC: 487 PG/ML (ref 200–1100)
WBC # BLD AUTO: 14.3 THOUSAND/UL (ref 3.8–10.8)

## 2025-07-02 ENCOUNTER — OFFICE VISIT (OUTPATIENT)
Dept: WOUND CARE | Facility: CLINIC | Age: 40
End: 2025-07-02
Payer: COMMERCIAL

## 2025-07-02 PROCEDURE — 99213 OFFICE O/P EST LOW 20 MIN: CPT

## 2025-07-06 DIAGNOSIS — E55.9 VITAMIN D DEFICIENCY: Primary | ICD-10-CM

## 2025-07-06 DIAGNOSIS — D50.0 IRON DEFICIENCY ANEMIA DUE TO CHRONIC BLOOD LOSS: ICD-10-CM

## 2025-07-06 DIAGNOSIS — K50.919 CROHN'S DISEASE WITH COMPLICATION, UNSPECIFIED GASTROINTESTINAL TRACT LOCATION: ICD-10-CM

## 2025-07-06 RX ORDER — ERGOCALCIFEROL 1.25 MG/1
1.25 CAPSULE ORAL WEEKLY
Qty: 8 CAPSULE | Refills: 0 | Status: SHIPPED | OUTPATIENT
Start: 2025-07-06 | End: 2025-08-31

## 2025-07-23 ENCOUNTER — HOSPITAL ENCOUNTER (OUTPATIENT)
Dept: CARDIOLOGY | Facility: CLINIC | Age: 40
Discharge: HOME | End: 2025-07-23
Payer: COMMERCIAL

## 2025-07-23 DIAGNOSIS — R01.1 MURMUR: ICD-10-CM

## 2025-07-23 PROCEDURE — 93306 TTE W/DOPPLER COMPLETE: CPT

## 2025-07-23 PROCEDURE — 93306 TTE W/DOPPLER COMPLETE: CPT | Performed by: INTERNAL MEDICINE

## 2025-07-24 LAB
AORTIC VALVE MEAN GRADIENT: 9 MMHG
AORTIC VALVE PEAK VELOCITY: 2.11 M/S
AV PEAK GRADIENT: 18 MMHG
AVA (PEAK VEL): 2.16 CM2
AVA (VTI): 2.24 CM2
EJECTION FRACTION APICAL 4 CHAMBER: 75
EJECTION FRACTION: 65 %
LEFT ATRIUM VOLUME AREA LENGTH INDEX BSA: 18.3 ML/M2
LEFT VENTRICLE INTERNAL DIMENSION DIASTOLE: 4.75 CM (ref 3.5–6)
LEFT VENTRICULAR OUTFLOW TRACT DIAMETER: 1.86 CM
MITRAL VALVE E/A RATIO: 1.6
RIGHT VENTRICLE FREE WALL PEAK S': 15 CM/S

## 2025-08-06 DIAGNOSIS — R73.03 PREDIABETES: ICD-10-CM

## 2025-08-08 RX ORDER — METFORMIN HYDROCHLORIDE 500 MG/1
TABLET, EXTENDED RELEASE ORAL
Qty: 180 TABLET | Refills: 1 | Status: SHIPPED | OUTPATIENT
Start: 2025-08-08

## 2025-09-17 ENCOUNTER — APPOINTMENT (OUTPATIENT)
Dept: GASTROENTEROLOGY | Facility: CLINIC | Age: 40
End: 2025-09-17
Payer: COMMERCIAL

## 2025-09-18 ENCOUNTER — APPOINTMENT (OUTPATIENT)
Dept: PRIMARY CARE | Facility: CLINIC | Age: 40
End: 2025-09-18
Payer: COMMERCIAL

## 2025-09-22 ENCOUNTER — APPOINTMENT (OUTPATIENT)
Dept: HEMATOLOGY/ONCOLOGY | Facility: HOSPITAL | Age: 40
End: 2025-09-22
Payer: COMMERCIAL

## 2025-11-06 ENCOUNTER — APPOINTMENT (OUTPATIENT)
Dept: DERMATOLOGY | Facility: CLINIC | Age: 40
End: 2025-11-06
Payer: COMMERCIAL

## 2025-12-18 ENCOUNTER — APPOINTMENT (OUTPATIENT)
Dept: HEMATOLOGY/ONCOLOGY | Facility: CLINIC | Age: 40
End: 2025-12-18
Payer: COMMERCIAL